# Patient Record
Sex: MALE | Race: WHITE
[De-identification: names, ages, dates, MRNs, and addresses within clinical notes are randomized per-mention and may not be internally consistent; named-entity substitution may affect disease eponyms.]

---

## 2020-07-15 ENCOUNTER — HOSPITAL ENCOUNTER (INPATIENT)
Dept: HOSPITAL 41 - JD.ED | Age: 55
LOS: 7 days | Discharge: NURSING, REHAB OR LONG-TERM CARE | End: 2020-07-22
Payer: COMMERCIAL

## 2020-07-15 DIAGNOSIS — G93.41: ICD-10-CM

## 2020-07-15 DIAGNOSIS — R33.9: ICD-10-CM

## 2020-07-15 DIAGNOSIS — R73.9: ICD-10-CM

## 2020-07-15 DIAGNOSIS — D72.829: ICD-10-CM

## 2020-07-15 DIAGNOSIS — F32.9: ICD-10-CM

## 2020-07-15 DIAGNOSIS — E87.2: ICD-10-CM

## 2020-07-15 DIAGNOSIS — E66.9: ICD-10-CM

## 2020-07-15 DIAGNOSIS — E83.42: ICD-10-CM

## 2020-07-15 DIAGNOSIS — R74.8: ICD-10-CM

## 2020-07-15 DIAGNOSIS — K21.9: ICD-10-CM

## 2020-07-15 DIAGNOSIS — K76.89: ICD-10-CM

## 2020-07-15 DIAGNOSIS — N17.9: ICD-10-CM

## 2020-07-15 DIAGNOSIS — N18.9: ICD-10-CM

## 2020-07-15 DIAGNOSIS — F10.239: Primary | ICD-10-CM

## 2020-07-15 DIAGNOSIS — E87.1: ICD-10-CM

## 2020-07-15 DIAGNOSIS — Z20.828: ICD-10-CM

## 2020-07-15 DIAGNOSIS — D69.6: ICD-10-CM

## 2020-07-15 DIAGNOSIS — E78.5: ICD-10-CM

## 2020-07-15 DIAGNOSIS — I10: ICD-10-CM

## 2020-07-15 DIAGNOSIS — Z79.899: ICD-10-CM

## 2020-07-15 PROCEDURE — U0002 COVID-19 LAB TEST NON-CDC: HCPCS

## 2020-07-15 RX ADMIN — THERA TABS SCH EACH: TAB at 20:21

## 2020-07-15 RX ADMIN — LORAZEPAM PRN MG: 2 INJECTION INTRAMUSCULAR; INTRAVENOUS at 23:23

## 2020-07-15 RX ADMIN — LORAZEPAM PRN MG: 2 INJECTION INTRAMUSCULAR; INTRAVENOUS at 22:10

## 2020-07-15 NOTE — EDM.PDOC
ED HPI GENERAL MEDICAL PROBLEM





- General


Chief Complaint: Drug or Alcohol Abuse


Stated Complaint: ALCOHOLIC ISSUES


Time Seen by Provider: 07/15/20 14:37


Source of Information: Reports: Patient, Family


History Limitations: Reports: No Limitations





- History of Present Illness


INITIAL COMMENTS - FREE TEXT/NARRATIVE: 





Patient is a 54-year-old male who presents to the emergency department with his 

wife with some altered mental status and staggering gait.  Wife states this 

began yesterday.  Wife reports patient has a history of alcohol abuse, however 

she is unsure the extent of it as he hides his drinking.  She is found empty 

1.25 L bottles of vodka on numerous occasions.  She knows with certainty that he

drinks  and Monday.  She states that this has been Becoming a worsening 

problem since January.  Wife states he has not gone to work all week, and this 

is happened on a number of other occasions.  He currently works for community 

[employer]..  In March he went through an episode where he quit drinking and did 

have withdrawal symptoms including shaking and diaphoresis.  Wife states that 

her and her daughters have been trying to keep a close eye on him.  She states 

that she "rigged the laundry room" because he uses that door to go out to the 

garage and she thinks that is probably where he has been drinking.  She states 

he has hiding spots for his alcohol but she does not know where there are.  She 

states that he does not want his mom to find out about his drinking because his 

dad  of alcoholism his grandfather was also a chronic alcoholic.





History from the patient is somewhat limited.  He was asked if he would like his

wife to leave the room while we visited which he stated he did, however after 

she left the room, he did not provide much information.  When asked how often he

drinks he says "I do not know ".  He tells me his last drink was  night, 

however his wife states that he did drink on Monday also.  When asked if he 

needs help with his drinking he says no.  When asked how he is feeling today he 

states "horrible ".  He does not offer any input as to what he means by that.  

When asked if he has a headache he says yes he also agrees that he is nauseous. 

He does not feel confused, denies any visual disturbances, denies auditory 

hallucinations.  When asked if he knows what the day of the week is, he states 

yes, however he is unable to provide the day of the week.  He did agree to have 

a work-up done.





- Related Data


                                    Allergies











Allergy/AdvReac Type Severity Reaction Status Date / Time


 


No Known Allergies Allergy   Verified 07/15/20 14:51











Home Meds: 


                                    Home Meds





Hydrochlorothiazide/Lisinopril [Lisinopril/HCTZ 20-12.5 MG] 1 tab PO DAILY 

07/15/20 [History]


Metoprolol Succinate [Toprol XL 50mg] 50 mg PO DAILY 07/15/20 [History]


Omeprazole 1 tab PO DAILY 07/15/20 [History]


Sertraline [Zoloft] 50 mg PO DAILY 07/15/20 [History]


Simvastatin 40 mg PO DAILY 07/15/20 [History]











Past Medical History


HEENT History: Reports: Impaired Vision


Cardiovascular History: Reports: High Cholesterol, Hypertension


Psychiatric History: Reports: Addiction, Depression





- Past Surgical History


GI Surgical History: Reports: Appendectomy





ED ROS GENERAL





- Review of Systems


Review Of Systems: See Below


Constitutional: Reports: No Symptoms.  Denies: Fever, Chills


HEENT: Reports: No Symptoms


Respiratory: Reports: No Symptoms.  Denies: Shortness of Breath, Cough


Cardiovascular: Reports: No Symptoms.  Denies: Chest Pain, Syncope


Endocrine: Reports: No Symptoms


GI/Abdominal: Reports: Nausea.  Denies: Abdominal Pain, Diarrhea, Vomiting


: Reports: No Symptoms


Musculoskeletal: Reports: No Symptoms


Skin: Reports: No Symptoms


Neurological: Reports: Confusion, Headache, Gait Disturbance.  Denies: Dizziness


Psychiatric: Reports: Confusion, Depression.  Denies: Agitation, Hallucinations,

 Suicidal Ideation


Hematologic/Lymphatic: Reports: No Symptoms


Immunologic: Reports: No Symptoms





- Physical Exam


Exam: See Below


Exam Limited By: No Limitations


General Appearance: Alert, WD/WN, No Apparent Distress


Eye Exam: Bilateral Eye: PERRL


Respiratory/Chest: No Respiratory Distress, Lungs Clear, Normal Breath Sounds, 

No Accessory Muscle Use, Chest Non-Tender


Cardiovascular: Normal Peripheral Pulses, Regular Rate, Rhythm, No Edema, No 

Gallop, No JVD, No Murmur, No Rub


GI/Abdominal: Normal Bowel Sounds, Soft, Non-Tender, No Organomegaly, No 

Distention, No Abnormal Bruit, No Mass


Neuro Exam (Abbreviated): Alert, CN II-XII Intact, Normal Cognition, Normal 

Gait, Normal Reflexes, No Motor/Sensory Deficits, Confused, Disoriented (To 

time)


Psychiatric: Flat Affect


Skin Exam: Warm, Dry, Intact, Normal Color, No Rash





EKG INTERPRETATION


EKG Date: 07/15/20


Time: 15:37


Rhythm: NSR


Rate (Beats/Min): 76


Axis: LAD-Left Axis Deviation


P-Wave: Present


QRS: Normal


ST-T: Normal


QT: Normal


EKG Interpretation Comments: 


Sinus rhythm at 76/min


Left axis deviation


Early R wave transition -consider right ventricular hypertrophy/septal 

hypertrophy


Q waves in lead III and aVF-old inferior wall MI


EKG interpreted by Dr. Luann HERRING








Course





- Vital Signs


Last Recorded V/S: 


                                Last Vital Signs











Temp  98.5 F   07/15/20 18:50


 


Pulse  79   07/15/20 14:45


 


Resp  20   07/15/20 18:50


 


BP  156/81 H  07/15/20 18:50


 


Pulse Ox  97   07/15/20 18:50














- Orders/Labs/Meds


Orders: 


                               Active Orders 24 hr











 Category Date Time Status


 


 Sodium Chloride 0.9% [Normal Saline] 1,000 ml Med  07/15/20 15:30 Active





 IV ASDIRECTED   


 


 Sodium Chloride 0.9% [Normal Saline] 1,000 ml Med  07/15/20 16:30 Active





 IV ASDIRECTED   


 


 Sodium Chloride 0.9% [Saline Flush] Med  07/15/20 15:17 Active





 10 ml FLUSH ASDIRECTED PRN   


 


 Peripheral IV Insertion Adult [OM.PC] Stat Oth  07/15/20 15:16 Ordered








                                Medication Orders





Albuterol/Ipratropium (Duoneb 3.0-0.5 Mg/3 Ml)  3 ml NEB Q4H PRN


   PRN Reason: Shortness Of Breath/wheezing


Bisacodyl (Dulcolax)  5 mg PO DAILY PRN


   PRN Reason: Constipation


Clonidine HCl (Catapres)  0.1 mg PO Q4H PRN


   PRN Reason: Agitation


Docusate Sodium (Colace)  100 mg PO BID PRN


   PRN Reason: Constipation


Folic Acid (Folic Acid)  1 mg PO BEDTIME [facility]


   Last Admin: 07/15/20 20:21  Dose: 1 mg


   Documented by: [email or web address]


Haloperidol Lactate (Haldol)  2 mg IM Q4H PRN


   PRN Reason: Agitation


Sodium Chloride (Normal Saline)  1,000 mls @ 999 mls/hr IV ASDIRECTED [facility]


   Last Admin: 07/15/20 15:29  Dose: 999 mls/hr


   Documented by: HERMMIC


Sodium Chloride (Normal Saline)  1,000 mls @ 999 mls/hr IV ASDIRECTED [facility]


   Last Admin: 07/15/20 17:29  Dose: 999 mls/hr


   Documented by: HERMMIC


Promethazine HCl 12.5 mg/ (Sodium Chloride)  50.5 mls @ 100 mls/hr IV Q6H PRN


   PRN Reason: Nausea/Vomiting


   Last Admin: 07/15/20 20:30  Dose: 100 mls/hr


   Documented by: WENDI


Metoprolol Succinate (Toprol Xl)  50 mg PO DAILY [facility]


Metoprolol Tartrate (Lopressor)  5 mg IVPUSH Q4H PRN


   PRN Reason: Tachycardia


Multivitamins (Thera)  1 each PO BEDTIME [facility]


   Last Admin: 07/15/20 20:21  Dose: 1 each


   Documented by: WENDI


Ondansetron HCl (Zofran)  4 mg IV Q6H PRN


   PRN Reason: Nausea/Vomiting


Pantoprazole Sodium (Protonix***)  40 mg PO DAILY [facility]


Quetiapine Fumarate (Seroquel)  50 mg PO BEDTIME [facility]


   Last Admin: 07/15/20 20:21  Dose: 50 mg


   Documented by: WENDI


Senna/Docusate Sodium (Senna Plus)  1 tab PO BID PRN


   PRN Reason: Constipation


Sodium Chloride (Saline Flush)  10 ml FLUSH ASDIRECTED PRN


   PRN Reason: Keep Vein Open


   Last Admin: 07/15/20 15:31  Dose: 10 ml


   Documented by: JAKUB


Thiamine HCl (Vitamin B-1)  100 mg PO BEDTIME [facility]


   Last Admin: 07/15/20 20:21  Dose: 100 mg


   Documented by: WENDI


Topiramate (Topamax)  25 mg PO BID [facility]


   Last Admin: 07/15/20 20:21  Dose: 25 mg


   Documented by: WENDI








Labs: 


                                Laboratory Tests











  07/15/20 07/15/20 07/15/20 Range/Units





  14:40 14:48 14:48 


 


WBC   11.80 H   (4.23-9.07)  K/mm3


 


RBC   5.76   (4.63-6.08)  M/mm3


 


Hgb   16.2   (13.7-17.5)  gm/dl


 


Hct   47.0   (40.1-51.0)  %


 


MCV   81.6   (79.0-92.2)  fl


 


MCH   28.1   (25.7-32.2)  pg


 


MCHC   34.5   (32.2-35.5)  g/dl


 


RDW Std Deviation   41.0   (35.1-43.9)  fL


 


Plt Count   493 H   (163-337)  K/mm3


 


MPV   8.1 L   (9.4-12.3)  fl


 


Neut % (Auto)   78.6 H   (34.0-67.9)  %


 


Lymph % (Auto)   17.0 L   (21.8-53.1)  %


 


Mono % (Auto)   4.1 L   (5.3-12.2)  %


 


Eos % (Auto)   0 L   (0.8-7.0)  


 


Baso % (Auto)   0.1   (0.1-1.2)  %


 


Neut # (Auto)   9.28 H   (1.78-5.38)  K/mm3


 


Lymph # (Auto)   2.01   (1.32-3.57)  K/mm3


 


Mono # (Auto)   0.48   (0.30-0.82)  K/mm3


 


Eos # (Auto)   0.00 L   (0.04-0.54)  K/mm3


 


Baso # (Auto)   0.01   (0.01-0.08)  K/mm3


 


Sodium    133 L  (136-145)  mEq/L


 


Potassium    4.2  (3.5-5.1)  mEq/L


 


Chloride    98  ()  mEq/L


 


Carbon Dioxide    19 L  (21-32)  mEq/L


 


Anion Gap    20.2 H  (5-15)  


 


BUN    49 H  (7-18)  mg/dL


 


Creatinine    2.6 H  (0.7-1.3)  mg/dL


 


Est Cr Clr Drug Dosing    31.42  mL/min


 


Estimated GFR (MDRD)    26  (>60)  mL/min


 


BUN/Creatinine Ratio    18.8 H  (14-18)  


 


Glucose    172 H  ()  mg/dL


 


Calcium    8.8  (8.5-10.1)  mg/dL


 


Magnesium  2.4    (1.8-2.4)  mg/dl


 


Total Bilirubin    0.6  (0.2-1.0)  mg/dL


 


AST    48 H  (15-37)  U/L


 


ALT    77 H  (16-63)  U/L


 


Alkaline Phosphatase    98  ()  U/L


 


Creatine Kinase     ()  U/L


 


Troponin I    < 0.017  (0.00-0.056)  ng/mL


 


C-Reactive Protein    < 0.2  (<1.0)  mg/dL


 


Total Protein    8.4 H  (6.4-8.2)  g/dl


 


Albumin    4.1  (3.4-5.0)  g/dl


 


Globulin    4.3  gm/dL


 


Albumin/Globulin Ratio    1.0  (1-2)  


 


Ethyl Alcohol    0.00  (0.00)  gm%


 


COVID-19 (MILES)     (NEGATIVE)  














  07/15/20 07/15/20 Range/Units





  14:48 17:02 


 


WBC    (4.23-9.07)  K/mm3


 


RBC    (4.63-6.08)  M/mm3


 


Hgb    (13.7-17.5)  gm/dl


 


Hct    (40.1-51.0)  %


 


MCV    (79.0-92.2)  fl


 


MCH    (25.7-32.2)  pg


 


MCHC    (32.2-35.5)  g/dl


 


RDW Std Deviation    (35.1-43.9)  fL


 


Plt Count    (163-337)  K/mm3


 


MPV    (9.4-12.3)  fl


 


Neut % (Auto)    (34.0-67.9)  %


 


Lymph % (Auto)    (21.8-53.1)  %


 


Mono % (Auto)    (5.3-12.2)  %


 


Eos % (Auto)    (0.8-7.0)  


 


Baso % (Auto)    (0.1-1.2)  %


 


Neut # (Auto)    (1.78-5.38)  K/mm3


 


Lymph # (Auto)    (1.32-3.57)  K/mm3


 


Mono # (Auto)    (0.30-0.82)  K/mm3


 


Eos # (Auto)    (0.04-0.54)  K/mm3


 


Baso # (Auto)    (0.01-0.08)  K/mm3


 


Sodium    (136-145)  mEq/L


 


Potassium    (3.5-5.1)  mEq/L


 


Chloride    ()  mEq/L


 


Carbon Dioxide    (21-32)  mEq/L


 


Anion Gap    (5-15)  


 


BUN    (7-18)  mg/dL


 


Creatinine    (0.7-1.3)  mg/dL


 


Est Cr Clr Drug Dosing    mL/min


 


Estimated GFR (MDRD)    (>60)  mL/min


 


BUN/Creatinine Ratio    (14-18)  


 


Glucose    ()  mg/dL


 


Calcium    (8.5-10.1)  mg/dL


 


Magnesium    (1.8-2.4)  mg/dl


 


Total Bilirubin    (0.2-1.0)  mg/dL


 


AST    (15-37)  U/L


 


ALT    (16-63)  U/L


 


Alkaline Phosphatase    ()  U/L


 


Creatine Kinase  229   ()  U/L


 


Troponin I    (0.00-0.056)  ng/mL


 


C-Reactive Protein    (<1.0)  mg/dL


 


Total Protein    (6.4-8.2)  g/dl


 


Albumin    (3.4-5.0)  g/dl


 


Globulin    gm/dL


 


Albumin/Globulin Ratio    (1-2)  


 


Ethyl Alcohol    (0.00)  gm%


 


COVID-19 (MILES)   Negative  (NEGATIVE)  











Meds: 


Medications











Generic Name Dose Route Start Last Admin





  Trade Name Freq  PRN Reason Stop Dose Admin


 


Albuterol/Ipratropium  3 ml  07/15/20 18:50 





  Duoneb 3.0-0.5 Mg/3 Ml  NEB  





  Q4H PRN  





  Shortness Of Breath/wheezing  


 


Bisacodyl  5 mg  07/15/20 18:50 





  Dulcolax  PO  





  DAILY PRN  





  Constipation  


 


Clonidine HCl  0.1 mg  07/15/20 19:02 





  Catapres  PO  





  Q4H PRN  





  Agitation  


 


Docusate Sodium  100 mg  07/15/20 18:50 





  Colace  PO  





  BID PRN  





  Constipation  


 


Folic Acid  1 mg  07/15/20 21:00  07/15/20 20:21





  Folic Acid  PO   1 mg





  BEDTIME [facility]   Administration


 


Haloperidol Lactate  2 mg  07/15/20 19:02 





  Haldol  IM  





  Q4H PRN  





  Agitation  


 


Sodium Chloride  1,000 mls @ 999 mls/hr  07/15/20 15:30  07/15/20 15:29





  Normal Saline  IV   999 mls/hr





  ASDIRECTED [facility]   Administration


 


Sodium Chloride  1,000 mls @ 999 mls/hr  07/15/20 16:30  07/15/20 17:29





  Normal Saline  IV   999 mls/hr





  ASDIRECTED [facility]   Administration


 


Promethazine HCl 12.5 mg/  50.5 mls @ 100 mls/hr  07/15/20 18:50  07/15/20 20:30





  Sodium Chloride  IV   100 mls/hr





  Q6H PRN   Administration





  Nausea/Vomiting  


 


Metoprolol Succinate  50 mg  20 09:00 





  Toprol Xl  PO  





  DAILY [facility]  


 


Metoprolol Tartrate  5 mg  07/15/20 19:03 





  Lopressor  IVPUSH  





  Q4H PRN  





  Tachycardia  


 


Multivitamins  1 each  07/15/20 21:00  07/15/20 20:21





  Thera  PO   1 each





  BEDTIME [facility]   Administration


 


Ondansetron HCl  4 mg  07/15/20 18:50 





  Zofran  IV  





  Q6H PRN  





  Nausea/Vomiting  


 


Pantoprazole Sodium  40 mg  20 09:00 





  Protonix***  PO  





  DAILY [facility]  


 


Quetiapine Fumarate  50 mg  07/15/20 21:00  07/15/20 20:21





  Seroquel  PO   50 mg





  BEDTIME [facility]   Administration


 


Senna/Docusate Sodium  1 tab  07/15/20 18:50 





  Senna Plus  PO  





  BID PRN  





  Constipation  


 


Sodium Chloride  10 ml  07/15/20 15:17  07/15/20 15:31





  Saline Flush  FLUSH   10 ml





  ASDIRECTED PRN   Administration





  Keep Vein Open  


 


Thiamine HCl  100 mg  07/15/20 21:00  07/15/20 20:21





  Vitamin B-1  PO   100 mg





  BEDTIME [facility]   Administration


 


Topiramate  25 mg  07/15/20 21:00  07/15/20 20:21





  Topamax  PO   25 mg





  BID [facility]   Administration














Discontinued Medications














Generic Name Dose Route Start Last Admin





  Trade Name Freq  PRN Reason Stop Dose Admin


 


Diphenhydramine HCl  25 mg  07/15/20 19:01  07/15/20 20:22





  Benadryl  IVPUSH  07/15/20 19:02  25 mg





  ONETIME ONE   Administration


 


Ondansetron HCl  4 mg  07/15/20 15:17  07/15/20 15:29





  Zofran  IVPUSH  07/15/20 15:18  4 mg





  ONETIME ONE   Administration














- Re-Assessments/Exams


Free Text/Narrative Re-Assessment/Exam: 


Patient is a 54-year-old male brought in by his wife with complaints of altered 

mental status and unsteady gait.  Patient does not offer much information, and 

wife is unsure of how much he drinks as he hides his drinking.  He could be 

suffering from alcohol withdrawal, however it is difficult to determine this 

based on his uncertain history of alcohol abuse.  Neurologic exam is essentially

 normal, with the exception of his confusion.  Grasps are equal and strong.  

Pupils are equal and reactive.  I have ordered a CBC, CMP, CRP, troponin, EKG, 

EtOH, urinalysis, drug screen, and CT scan of the head.  We will give him a 

liter of IV fluids, as well as Zofran for nausea.





07/15/20 16:11


Hematology was significant for WBC slightly elevated 11.8, platelets elevated at

 493, sodium slightly low at 133, CO2 low at 19, anion gap elevated at 20.2, BUN

 elevated 49, creatinine elevated at 2.6, glucose elevated at 172, troponin is 

negative, CRP is normal, blood alcohol is 0, CT scan of the head was negative 

for any acute findings.  Based on patient's new onset confusion and difficulty 

with gait, we could be dealing with alcohol withdrawal, however I have ordered 

an MRI of the brain to rule out stroke.  Discussed this with the patient and his

 wife and they are in agreement with this plan.  I will order a second liter of 

NS bolus as he is quite dry.  Urinalysis and drug screen pending.





07/15/20 17:51


MRI results were negative for any acute abnormalities.  Patient has yet to 

produce a urine sample.  I did call and speak with the hospitalist on-call, Dr. Cabrera.  He has accepted the patient for admission to the ICU for confusion and 

acute renal failure.








Departure





- Departure


Time of Disposition: 17:51


Disposition: Admitted As Inpatient 66


Condition: Good


Clinical Impression: 


Altered mental state


Qualifiers:


 Altered mental status type: disorientation Qualified Code(s): R41.0 - 

Disorientation, unspecified





Acute renal failure (ARF)


Qualifiers:


 Acute renal failure type: unspecified Qualified Code(s): N17.9 - Acute kidney 

failure, unspecified








- Discharge Information





Sepsis Event Note (ED)





- Evaluation


Sepsis Screening Result: No Definite Risk





- Focused Exam


Vital Signs: 


                                   Vital Signs











  Temp Pulse Resp BP Pulse Ox


 


 07/15/20 14:45  96.0 F L  79  16  121/82  94 L














- My Orders


Last 24 Hours: 


My Active Orders





07/15/20 15:16


Peripheral IV Insertion Adult [OM.PC] Stat 





07/15/20 15:17


Sodium Chloride 0.9% [Saline Flush]   10 ml FLUSH ASDIRECTED PRN 





07/15/20 15:30


Sodium Chloride 0.9% [Normal Saline] 1,000 ml IV ASDIRECTED 





07/15/20 16:30


Sodium Chloride 0.9% [Normal Saline] 1,000 ml IV ASDIRECTED 














- Assessment/Plan


Last 24 Hours: 


My Active Orders





07/15/20 15:16


Peripheral IV Insertion Adult [OM.PC] Stat 





07/15/20 15:17


Sodium Chloride 0.9% [Saline Flush]   10 ml FLUSH ASDIRECTED PRN 





07/15/20 15:30


Sodium Chloride 0.9% [Normal Saline] 1,000 ml IV ASDIRECTED 





07/15/20 16:30


Sodium Chloride 0.9% [Normal Saline] 1,000 ml IV ASDIRECTED

## 2020-07-15 NOTE — PCM.HP.2
H&P History of Present Illness





- General


Date of Service: 07/15/20


Admit Problem/Dx: 


ETOH Withdrawal


Source of Information: Family, Provider, RN Notes Reviewed


History Limitations: Reports: Altered Mental Status





- History of Present Illness


Initial Comments - Free Text/Narative: 


This is a 55 yo white male with past medical hx/o chronic alcoholism, 

hypertension, hyperlipidemia, GERD, and depression who was brought in by his 

wife due to acute confusion with withdrawal symptoms. His last drink was appar

ently last night but according to him it was this past Sunday. He was not very 

forthcoming with his alcohol abuse. However his wife thinks he drinks heavily 

about 1.25 L of vodka a day and hides his drinking from family. His wife 

believes his drinking has gotten out of hand since January. He has had etoh 

withdrawal in the past. Per family, he was tremulous and sweating. This morning 

he was alert, awake, but confused. He was able to answer yes or no but cannot 

offer a meaning conversation. No report of visual, tactile or auditory 

hallucinations.





His initial work up in the emergency shows a CBC remarkable for WBC of 11.80, 

platelet # of 493, Neutrophils of 78.6%, Lymphocyte # of 147% and Monocytes # of

4.1%. His chemistry is significant for sodium of 133, CO2 of 19, AG of 20.2, BUN

of 49, Cr of 2.6, BS of 172, AST of 48, ALT of 77 and Total Protein of 8.4. His 

ETOH and COVID-19 screening are negative. Brain MRI and Head CT scan reports are

both negative for acute abnormal findings. Patient is coming in primarily for 

etoh withdrawal. 





- Related Data


Allergies/Adverse Reactions: 


                                    Allergies











Allergy/AdvReac Type Severity Reaction Status Date / Time


 


No Known Allergies Allergy   Verified 07/15/20 23:41











Home Medications: 


                                    Home Meds





Hydrochlorothiazide/Lisinopril [Lisinopril/HCTZ 20-12.5 MG] 1 tab PO DAILY 

07/15/20 [History]


Metoprolol Succinate [Toprol XL 50mg] 50 mg PO DAILY 07/15/20 [History]


Omeprazole 1 tab PO DAILY 07/15/20 [History]


Sertraline [Zoloft] 50 mg PO DAILY 07/15/20 [History]


Simvastatin 40 mg PO DAILY 07/15/20 [History]











Past Medical History


HEENT History: Reports: Impaired Vision


Cardiovascular History: Reports: High Cholesterol, Hypertension


Psychiatric History: Reports: Addiction, Depression





- Past Surgical History


GI Surgical History: Reports: Appendectomy





Social & Family History





- Tobacco Use


Smoking Status *Q: Never Smoker





- Caffeine Use


Caffeine Use: Reports: Coffee





- Recreational Drug Use


Recreational Drug Use: No





H&P Review of Systems





- Review of Systems:


Review Of Systems: See Below


General: Denies: Fever, Chills


HEENT: Reports: Headaches


Pulmonary: Denies: Shortness of Breath, Pleuritic Chest Pain


Cardiovascular: Denies: Chest Pain, Palpitations, Dyspnea on Exertion


Gastrointestinal: Reports: Nausea.  Denies: Abdominal Pain, Constipation, 

Vomiting


Genitourinary: Reports: No Symptoms


Musculoskeletal: Reports: No Symptoms


Skin: Denies: Diaphoresis, Bruising, Rash


Psychiatric: Reports: Confusion.  Denies: Anxiety, Agitation, Cravings, 

Hallucinations, Suicidal Ideation, Homicidal Ideation


Neurological: Reports: Gait Disturbance.  Denies: Dizziness, Numbness, 

Paresthesia, Seizure, Syncope, Tingling, Tremors, Trouble Speaking, Weakness, 

Change in Speech


Hematologic/Lymphatic: Reports: No Symptoms


Immunologic: Reports: No Symptoms





Exam





- Exam


Exam: See Below





- Vital Signs


Vital Signs: 


                                Last Vital Signs











Temp  35.6 C L  07/15/20 14:45


 


Pulse  79   07/15/20 14:45


 


Resp  16   07/15/20 14:45


 


BP  121/82   07/15/20 14:45


 


Pulse Ox  94 L  07/15/20 14:45











Weight: 95.254 kg





- Exam


General: Cooperative.  No: Moderate Distress


HEENT: Conjunctiva Clear, EACs Clear


Neck: Supple, Trachea Midline


Lungs: Clear to Auscultation, Normal Respiratory Effort


Cardiovascular: Regular Rate, Regular Rhythm


GI/Abdominal Exam: Normal Bowel Sounds, Soft, Non-Tender, No Organomegaly, No 

Distention, No Abnormal Bruit


 (Male) Exam: Deferred


Rectal (Males) Exam: Deferred


Back Exam: Normal Inspection, Decreased Range of Motion


Extremities: Normal Inspection, Normal Range of Motion, Non-Tender, No Pedal 

Edema, Other (unstable gait)


Peripheral Pulses: 1+: Dorsalis Pedis (L), Dorsalis Pedis (R)


Skin: Warm, Dry, Intact


Neurological: Abnormal Gait


Neuro Extensive - Mental Status: Normal Mood/Affect, Opens Eyes to Commands, 

Slow Response to Commands, Other (able to answer yes or no; he is not able to 

offer a meaningful conversation).  No: Normal Cognition


Neuro Extensive - Motor, Sensory, Reflexes: Other (Confused at the present).  

No: Tremor


Psychiatric: No: Anxious, Agitated, Suicidal Ideation, Homicidal Ideation, 

Hallucinations, Withdrawal Symptoms





- Patient Data


Lab Results Last 24 hrs: 


                         Laboratory Results - last 24 hr











  07/15/20 07/15/20 07/15/20 Range/Units





  14:48 14:48 14:48 


 


WBC  11.80 H    (4.23-9.07)  K/mm3


 


RBC  5.76    (4.63-6.08)  M/mm3


 


Hgb  16.2    (13.7-17.5)  gm/dl


 


Hct  47.0    (40.1-51.0)  %


 


MCV  81.6    (79.0-92.2)  fl


 


MCH  28.1    (25.7-32.2)  pg


 


MCHC  34.5    (32.2-35.5)  g/dl


 


RDW Std Deviation  41.0    (35.1-43.9)  fL


 


Plt Count  493 H    (163-337)  K/mm3


 


MPV  8.1 L    (9.4-12.3)  fl


 


Neut % (Auto)  78.6 H    (34.0-67.9)  %


 


Lymph % (Auto)  17.0 L    (21.8-53.1)  %


 


Mono % (Auto)  4.1 L    (5.3-12.2)  %


 


Eos % (Auto)  0 L    (0.8-7.0)  


 


Baso % (Auto)  0.1    (0.1-1.2)  %


 


Neut # (Auto)  9.28 H    (1.78-5.38)  K/mm3


 


Lymph # (Auto)  2.01    (1.32-3.57)  K/mm3


 


Mono # (Auto)  0.48    (0.30-0.82)  K/mm3


 


Eos # (Auto)  0.00 L    (0.04-0.54)  K/mm3


 


Baso # (Auto)  0.01    (0.01-0.08)  K/mm3


 


Sodium   133 L   (136-145)  mEq/L


 


Potassium   4.2   (3.5-5.1)  mEq/L


 


Chloride   98   ()  mEq/L


 


Carbon Dioxide   19 L   (21-32)  mEq/L


 


Anion Gap   20.2 H   (5-15)  


 


BUN   49 H   (7-18)  mg/dL


 


Creatinine   2.6 H   (0.7-1.3)  mg/dL


 


Est Cr Clr Drug Dosing   31.42   mL/min


 


Estimated GFR (MDRD)   26   (>60)  mL/min


 


BUN/Creatinine Ratio   18.8 H   (14-18)  


 


Glucose   172 H   ()  mg/dL


 


Calcium   8.8   (8.5-10.1)  mg/dL


 


Total Bilirubin   0.6   (0.2-1.0)  mg/dL


 


AST   48 H   (15-37)  U/L


 


ALT   77 H   (16-63)  U/L


 


Alkaline Phosphatase   98   ()  U/L


 


Creatine Kinase    229  ()  U/L


 


Troponin I   < 0.017   (0.00-0.056)  ng/mL


 


C-Reactive Protein   < 0.2   (<1.0)  mg/dL


 


Total Protein   8.4 H   (6.4-8.2)  g/dl


 


Albumin   4.1   (3.4-5.0)  g/dl


 


Globulin   4.3   gm/dL


 


Albumin/Globulin Ratio   1.0   (1-2)  


 


Ethyl Alcohol   0.00   (0.00)  gm%


 


COVID-19 (MILES)     (NEGATIVE)  














  07/15/20 Range/Units





  17:02 


 


WBC   (4.23-9.07)  K/mm3


 


RBC   (4.63-6.08)  M/mm3


 


Hgb   (13.7-17.5)  gm/dl


 


Hct   (40.1-51.0)  %


 


MCV   (79.0-92.2)  fl


 


MCH   (25.7-32.2)  pg


 


MCHC   (32.2-35.5)  g/dl


 


RDW Std Deviation   (35.1-43.9)  fL


 


Plt Count   (163-337)  K/mm3


 


MPV   (9.4-12.3)  fl


 


Neut % (Auto)   (34.0-67.9)  %


 


Lymph % (Auto)   (21.8-53.1)  %


 


Mono % (Auto)   (5.3-12.2)  %


 


Eos % (Auto)   (0.8-7.0)  


 


Baso % (Auto)   (0.1-1.2)  %


 


Neut # (Auto)   (1.78-5.38)  K/mm3


 


Lymph # (Auto)   (1.32-3.57)  K/mm3


 


Mono # (Auto)   (0.30-0.82)  K/mm3


 


Eos # (Auto)   (0.04-0.54)  K/mm3


 


Baso # (Auto)   (0.01-0.08)  K/mm3


 


Sodium   (136-145)  mEq/L


 


Potassium   (3.5-5.1)  mEq/L


 


Chloride   ()  mEq/L


 


Carbon Dioxide   (21-32)  mEq/L


 


Anion Gap   (5-15)  


 


BUN   (7-18)  mg/dL


 


Creatinine   (0.7-1.3)  mg/dL


 


Est Cr Clr Drug Dosing   mL/min


 


Estimated GFR (MDRD)   (>60)  mL/min


 


BUN/Creatinine Ratio   (14-18)  


 


Glucose   ()  mg/dL


 


Calcium   (8.5-10.1)  mg/dL


 


Total Bilirubin   (0.2-1.0)  mg/dL


 


AST   (15-37)  U/L


 


ALT   (16-63)  U/L


 


Alkaline Phosphatase   ()  U/L


 


Creatine Kinase   ()  U/L


 


Troponin I   (0.00-0.056)  ng/mL


 


C-Reactive Protein   (<1.0)  mg/dL


 


Total Protein   (6.4-8.2)  g/dl


 


Albumin   (3.4-5.0)  g/dl


 


Globulin   gm/dL


 


Albumin/Globulin Ratio   (1-2)  


 


Ethyl Alcohol   (0.00)  gm%


 


COVID-19 (MILES)  Negative  (NEGATIVE)  











Result Diagrams: 


                                 07/16/20 05:28





                                 07/16/20 05:28





Sepsis Event Note





- Evaluation


Sepsis Screening Result: No Definite Risk





- Focused Exam


Vital Signs: 


                                   Vital Signs











  Temp Pulse Resp BP Pulse Ox


 


 07/15/20 14:45  35.6 C L  79  16  121/82  94 L











Date Exam was Performed: 07/16/20


Time Exam was Performed: 11:13


Problem List Initiated/Reviewed/Updated: Yes


Orders Last 24hrs: 


                               Active Orders 24 hr











 Category Date Time Status


 


 EKG Documentation Completion [RC] STAT Care  07/15/20 15:16 Active


 


 Peripheral IV Care [RC] .AS DIRECTED Care  07/15/20 15:17 Active


 


 DRUG SCREEN, URINE [URCHEM] Stat Lab  07/15/20 15:17 Ordered


 


 UA RFX DAYLIN AND CULT IF INDIC [URIN] Stat Lab  07/15/20 15:16 Ordered


 


 Sodium Chloride 0.9% [Normal Saline] 1,000 ml Med  07/15/20 15:30 Active





 IV ASDIRECTED   


 


 Sodium Chloride 0.9% [Normal Saline] 1,000 ml Med  07/15/20 16:30 Active





 IV ASDIRECTED   


 


 Sodium Chloride 0.9% [Saline Flush] Med  07/15/20 15:17 Active





 10 ml FLUSH ASDIRECTED PRN   


 


 Peripheral IV Insertion Adult [OM.PC] Stat Oth  07/15/20 15:16 Ordered








                                Medication Orders





Sodium Chloride (Normal Saline)  1,000 mls @ 999 mls/hr IV ASDIRECTED COURTNEY


   Last Admin: 07/15/20 15:29  Dose: 999 mls/hr


   Documented by: HERMMIC


Sodium Chloride (Normal Saline)  1,000 mls @ 999 mls/hr IV ASDIRECTED COURTNEY


   Last Admin: 07/15/20 17:29  Dose: 999 mls/hr


   Documented by: HERMMIC


Sodium Chloride (Saline Flush)  10 ml FLUSH ASDIRECTED PRN


   PRN Reason: Keep Vein Open


   Last Admin: 07/15/20 15:31  Dose: 10 ml


   Documented by: JAKUB








Assessment/Plan Comment:: 


This is a 55 yo white male with past medical hx/o chronic alcoholism, 

hypertension, hyperlipidemia, GERD, and depression who was brought in by his 

wife due to acute confusion with withdrawal symptoms.





Assessment:


Acute:


* ETOH Abuse/Withdrawal Symptoms. He carries a hx/o chronic alcoholism. He is 

  not very forth coming and it appears he hides his drinking from his family. he

   drinks hard liquor. His wife found 1.25L of Vodka. Per family, his drinking 

  has gotten out of hand since January. 





* Acute confusion 2/2 metabolic/toxic encephalopathy. Has abnormal e-lytes and 

  UA/UDS pending. Head CT scan and Brain MRI done in ED are both negative for 

  acute abnormal findings. This could be mainly to alcohol abuse. We will 

  monitor.





* Acute kidney injury vs CKD. This is likely 2/2 volume depletion and 

  dehydration from etoh abuse. No previous level for comparison. He comes in 

  with a CR of 2.6 with a BUN of 49. So far he has received 2L of NS in ED. 

  Plan: e-lytes studies, avoid nephrotoxic agents, monitor Is/Os, renal US to 

  r/o obstructive uropathy and repeat renal panel in AM.





* Hypovolemia Hyperosmolar Hyponatremia. This is likely due to etoh abuse and 

  not able to eat of drink adequately. He is volume depleted. he comes in with a

   NA level of 133. Although this could be affected by his HCTZ and Zoloft he 

  takes for maintenance medications. We will hold HCTZ for now and monitor lev

  els.





* Increased AG Metabolic Acidosis. This is 2/2 ETOh Abuse. he is afebrile and no

   signs of systemic infection. Expect to improve with hydration.





* Hyperglycemia. He comes to us with BS level of 172. He carries no hx/o glucose

   intolerance or diabetes. We will monitor.





* Transaminitis. He comes to us with AST and ALT levels of  respectively. This 

  is likely due to etoh abuse.He is on statin so we will hold if for now until 

  his liver enzymes improve.





* Mild Leukocytosis. This is likely due to stress. He is afebrile w/o signs of 

  systemic infection. We will monitor.





* Mild Thrombocytopenia. He comes in with an elevated platelet level of 493. 

  This is due to chronic alcohol use.


 


Chronic: 


* HTN


* HLD


* GERD


* Depression


* Chronic Alcoholism





Plan: Admit to ICU. ETOH Withdrawal orders. CIWAA protocol. Thiamine, folic acid

 and MVI. IV fluid for hydration. Renal U/S in AM. Avoid neprhotxic agents. 

Regular diet. Tele-psych consult with Dr. Muñiz. YEHUDA/CM for discharge planning.





- Mortality Measure


Prognosis:: Good

## 2020-07-15 NOTE — CT
Head CT

 

Technique: Multiple axial sections through the brain were obtained.  

Intravenous contrast was not utilized.

 

Comparison: No prior intracranial imaging is available.

 

Findings: Ventricles along with basal cisterns and sulci over the 

convexities are within normal limits for the patient's age.  No 

abnormal parenchymal densities are seen.  No evidence of intracranial 

hemorrhage.  No midline shift or mass-effect is seen.

 

Mild atherosclerotic calcification is seen within the carotid siphon 

and vertebral vessels.  Mild mucosal thickening is noted within the 

sphenoid and ethmoid sinuses.  Visualized mastoid sinuses are clear.  

No acute calvarial abnormality is appreciated.

 

Impression:

1.  Mild sinus findings which are most likely chronic.

2.  No acute intracranial abnormality is seen.

 

Note: Please correlate if patient's symptoms warrant further 

evaluation by MRI.

 

Diagnostic code #2

 

This report was dictated in MDT

## 2020-07-15 NOTE — MR
Mri brain

 

Technique: T1 sagittal; T2, T2 FLAIR, T1 and diffusion axial T2 

gradient echo axial; T1 and T2 gradient echo coronal images were 

obtained.

 

Comparison: Prior head CT study of 07/15/20.

 

Findings: Ventricles along with basal cisterns and sulci over the 

convexities appear within normal limits for the patient's age.  Normal

 signal void is seen within the major cerebral arteries within the 

skull base.  No abnormal signal is seen within the brain parenchyma.  

No midline shift or mass-effect is seen.  No acute diffusion 

abnormalities are appreciated.

 

Impression:

1.  No abnormality is appreciated on MRI study of the brain.  There 

are specifically no acute diffusion abnormalities being seen.

 

Diagnostic code #1

 

This report was dictated in MDT

## 2020-07-16 RX ADMIN — THERA TABS SCH EACH: TAB at 20:09

## 2020-07-16 RX ADMIN — LORAZEPAM PRN MG: 2 INJECTION INTRAMUSCULAR; INTRAVENOUS at 17:27

## 2020-07-16 RX ADMIN — LORAZEPAM PRN MG: 2 INJECTION INTRAMUSCULAR; INTRAVENOUS at 18:50

## 2020-07-16 RX ADMIN — LORAZEPAM PRN MG: 2 INJECTION INTRAMUSCULAR; INTRAVENOUS at 04:31

## 2020-07-16 RX ADMIN — LORAZEPAM PRN MG: 2 INJECTION INTRAMUSCULAR; INTRAVENOUS at 15:29

## 2020-07-16 RX ADMIN — LORAZEPAM PRN MG: 2 INJECTION INTRAMUSCULAR; INTRAVENOUS at 08:31

## 2020-07-16 RX ADMIN — LORAZEPAM PRN MG: 2 INJECTION INTRAMUSCULAR; INTRAVENOUS at 02:21

## 2020-07-16 RX ADMIN — LORAZEPAM PRN MG: 2 INJECTION INTRAMUSCULAR; INTRAVENOUS at 00:25

## 2020-07-16 RX ADMIN — LORAZEPAM PRN MG: 2 INJECTION INTRAMUSCULAR; INTRAVENOUS at 03:05

## 2020-07-16 RX ADMIN — HEPARIN SODIUM SCH UNITS: 5000 INJECTION, SOLUTION INTRAVENOUS; SUBCUTANEOUS at 13:30

## 2020-07-16 RX ADMIN — LORAZEPAM PRN MG: 2 INJECTION INTRAMUSCULAR; INTRAVENOUS at 05:42

## 2020-07-16 RX ADMIN — LORAZEPAM PRN MG: 2 INJECTION INTRAMUSCULAR; INTRAVENOUS at 11:03

## 2020-07-16 RX ADMIN — LORAZEPAM PRN MG: 2 INJECTION INTRAMUSCULAR; INTRAVENOUS at 20:11

## 2020-07-16 RX ADMIN — HEPARIN SODIUM SCH UNITS: 5000 INJECTION, SOLUTION INTRAVENOUS; SUBCUTANEOUS at 20:32

## 2020-07-16 NOTE — CT
CT abdomen and pelvis

 

Technique: Multiple axial sections were obtained from above the liver 

inferiorly through the pubic symphysis.  Intravenous and oral contrast

 not utilized.  Study performed as a ureteral stone protocol.

 

Comparison: Previous renal ultrasound exam performed earlier on the 

same day (8:53 AM).

 

Findings: Kidneys show no abnormal calcifications.  Previously 

suggested dilated left renal pelvis correlates to a cyst within the 

left kidney which is mostly cortical in location and measures about 

4.4 cm.  This slightly projects into the central kidney which is felt 

to be incidental.  No hydronephrosis is seen.  No ureteral dilatation 

or ureteral stone is seen.

 

Other findings: Liver shows fatty infiltration.  Scarring is noted 

within the right lung base.  Spleen appears within normal limits.  

Adrenal glands show no nodule.  Pancreas shows no discrete 

abnormality.  Gallbladder contains no calcified gallstones.  Abdominal

 aorta shows no aneurysm.  No retroperitoneal adenopathy or mesenteric

 abnormalities are seen.  Bulging of the anterior rectus sheath is 

seen anteriorly.  Garcia catheter is noted within the bladder.  No free

 fluid or inflammatory change is appreciated.  Mild diverticuli are 

seen within the sigmoid colon without diverticulitis.

 

Appendix is not appreciated with certainty.

 

Bone window settings were reviewed which shows mild degenerative 

change scattered within the spine.  No acute osseous finding is 

appreciated.

 

Fat-containing umbilical hernia is noted.

 

Impression:

1.  Cortical cyst within the left kidney causing the questioned 

dilated renal pelvis on recent ultrasound.  No hydronephrosis is seen.

  No renal calculi or ureteral calculi are seen.

2.  Other findings as noted above which are nonacute.  

 

Diagnostic code #2

 

This report was dictated in MDT

## 2020-07-16 NOTE — PCM.PN
- General Info


Date of Service: 07/16/20


Admission Dx/Problem (Free Text): 


ETOH Withdrawal


Subjective Update: 





7/16/20: Patient detoxed hard last night. He was getting ativan round the clock 

starting at 11 pm. He is comfortably resting this morning. His CIWAA is bet 7-

10.n His UA and UDS were negative. 


Functional Status: Reports: Pain Controlled





- Review of Systems


General: Denies: Fever, Chills


Pulmonary: Denies: Shortness of Breath


Gastrointestinal: Denies: Abdominal Pain, Nausea, Vomiting


Genitourinary: Denies: No Symptoms


Musculoskeletal: Reports: No Symptoms


Skin: Denies: Cyanosis, Diaphoresis


Neurological: Reports: Tremors, Gait Disturbance.  Denies: Seizure


Psychiatric: Reports: Confusion, Anxiety.  Denies: Hallucinations, Homicidal 

Ideation


Systems Review Comment:: 


ROS is limited, patient is heavily sedated but arousable.





- Patient Data


Vitals - Most Recent: 


                                Last Vital Signs











Temp  36.3 C   07/16/20 04:00


 


Pulse  79   07/15/20 14:45


 


Resp  20   07/16/20 04:00


 


BP  123/73   07/16/20 04:00


 


Pulse Ox  94 L  07/16/20 04:00











Weight - Most Recent: 101.242 kg


I&O - Last 24 Hours: 


                                 Intake & Output











 07/15/20 07/16/20 07/16/20





 22:59 06:59 14:59


 


Intake Total  471 


 


Output Total 525  


 


Balance -525 471 











Lab Results Last 24 Hours: 


                         Laboratory Results - last 24 hr











  07/15/20 07/15/20 07/15/20 Range/Units





  14:40 14:48 14:48 


 


WBC   11.80 H   (4.23-9.07)  K/mm3


 


RBC   5.76   (4.63-6.08)  M/mm3


 


Hgb   16.2   (13.7-17.5)  gm/dl


 


Hct   47.0   (40.1-51.0)  %


 


MCV   81.6   (79.0-92.2)  fl


 


MCH   28.1   (25.7-32.2)  pg


 


MCHC   34.5   (32.2-35.5)  g/dl


 


RDW Std Deviation   41.0   (35.1-43.9)  fL


 


Plt Count   493 H   (163-337)  K/mm3


 


MPV   8.1 L   (9.4-12.3)  fl


 


Neut % (Auto)   78.6 H   (34.0-67.9)  %


 


Lymph % (Auto)   17.0 L   (21.8-53.1)  %


 


Mono % (Auto)   4.1 L   (5.3-12.2)  %


 


Eos % (Auto)   0 L   (0.8-7.0)  


 


Baso % (Auto)   0.1   (0.1-1.2)  %


 


Neut # (Auto)   9.28 H   (1.78-5.38)  K/mm3


 


Lymph # (Auto)   2.01   (1.32-3.57)  K/mm3


 


Mono # (Auto)   0.48   (0.30-0.82)  K/mm3


 


Eos # (Auto)   0.00 L   (0.04-0.54)  K/mm3


 


Baso # (Auto)   0.01   (0.01-0.08)  K/mm3


 


Sodium    133 L  (136-145)  mEq/L


 


Potassium    4.2  (3.5-5.1)  mEq/L


 


Chloride    98  ()  mEq/L


 


Carbon Dioxide    19 L  (21-32)  mEq/L


 


Anion Gap    20.2 H  (5-15)  


 


BUN    49 H  (7-18)  mg/dL


 


Creatinine    2.6 H  (0.7-1.3)  mg/dL


 


Est Cr Clr Drug Dosing    31.42  mL/min


 


Estimated GFR (MDRD)    26  (>60)  mL/min


 


BUN/Creatinine Ratio    18.8 H  (14-18)  


 


Glucose    172 H  ()  mg/dL


 


Calcium    8.8  (8.5-10.1)  mg/dL


 


Magnesium  2.4    (1.8-2.4)  mg/dl


 


Total Bilirubin    0.6  (0.2-1.0)  mg/dL


 


AST    48 H  (15-37)  U/L


 


ALT    77 H  (16-63)  U/L


 


Alkaline Phosphatase    98  ()  U/L


 


Creatine Kinase     ()  U/L


 


Troponin I    < 0.017  (0.00-0.056)  ng/mL


 


C-Reactive Protein    < 0.2  (<1.0)  mg/dL


 


Total Protein    8.4 H  (6.4-8.2)  g/dl


 


Albumin    4.1  (3.4-5.0)  g/dl


 


Globulin    4.3  gm/dL


 


Albumin/Globulin Ratio    1.0  (1-2)  


 


Urine Color     (Yellow)  


 


Urine Appearance     (Clear)  


 


Urine pH     (5.0-8.0)  


 


Ur Specific Gravity     (1.005-1.030)  


 


Urine Protein     (Negative)  


 


Urine Glucose (UA)     (Negative)  


 


Urine Ketones     (Negative)  


 


Urine Occult Blood     (Negative)  


 


Urine Nitrite     (Negative)  


 


Urine Bilirubin     (Negative)  


 


Urine Urobilinogen     (0.2-1.0)  


 


Ur Leukocyte Esterase     (Negative)  


 


Urine Opiates Screen     (HQGWTV=288)  


 


Ur Buprenorphine Scrn     (CUTOFF=10)  


 


Ur Oxycodone Screen     (FWE7XH=046)  


 


Urine Methadone Screen     (FTJ3NY=964)  


 


Ur Propoxyphene Screen     (AZADLC=664)  


 


Ur Barbiturates Screen     (HKXDVG=937)  


 


Ur Tricyclics Screen     (GCWAOO=603)  


 


Ur Phencyclidine Scrn     (CUTOFF=25)  


 


Ur Amphetamine Screen     (GWMOCO=748)  


 


U Methamphetamines Scrn     (XUCEST=761)  


 


U Benzodiazepines Scrn     (HDTVBO=436)  


 


U Cocaine Metab Screen     (IKNCTJ=724)  


 


U Marijuana (THC) Screen     (CUTOFF=50)  


 


Ethyl Alcohol    0.00  (0.00)  gm%


 


COVID-19 (MILES)     (NEGATIVE)  














  07/15/20 07/15/20 07/15/20 Range/Units





  14:48 17:02 18:30 


 


WBC     (4.23-9.07)  K/mm3


 


RBC     (4.63-6.08)  M/mm3


 


Hgb     (13.7-17.5)  gm/dl


 


Hct     (40.1-51.0)  %


 


MCV     (79.0-92.2)  fl


 


MCH     (25.7-32.2)  pg


 


MCHC     (32.2-35.5)  g/dl


 


RDW Std Deviation     (35.1-43.9)  fL


 


Plt Count     (163-337)  K/mm3


 


MPV     (9.4-12.3)  fl


 


Neut % (Auto)     (34.0-67.9)  %


 


Lymph % (Auto)     (21.8-53.1)  %


 


Mono % (Auto)     (5.3-12.2)  %


 


Eos % (Auto)     (0.8-7.0)  


 


Baso % (Auto)     (0.1-1.2)  %


 


Neut # (Auto)     (1.78-5.38)  K/mm3


 


Lymph # (Auto)     (1.32-3.57)  K/mm3


 


Mono # (Auto)     (0.30-0.82)  K/mm3


 


Eos # (Auto)     (0.04-0.54)  K/mm3


 


Baso # (Auto)     (0.01-0.08)  K/mm3


 


Sodium     (136-145)  mEq/L


 


Potassium     (3.5-5.1)  mEq/L


 


Chloride     ()  mEq/L


 


Carbon Dioxide     (21-32)  mEq/L


 


Anion Gap     (5-15)  


 


BUN     (7-18)  mg/dL


 


Creatinine     (0.7-1.3)  mg/dL


 


Est Cr Clr Drug Dosing     mL/min


 


Estimated GFR (MDRD)     (>60)  mL/min


 


BUN/Creatinine Ratio     (14-18)  


 


Glucose     ()  mg/dL


 


Calcium     (8.5-10.1)  mg/dL


 


Magnesium     (1.8-2.4)  mg/dl


 


Total Bilirubin     (0.2-1.0)  mg/dL


 


AST     (15-37)  U/L


 


ALT     (16-63)  U/L


 


Alkaline Phosphatase     ()  U/L


 


Creatine Kinase  229    ()  U/L


 


Troponin I     (0.00-0.056)  ng/mL


 


C-Reactive Protein     (<1.0)  mg/dL


 


Total Protein     (6.4-8.2)  g/dl


 


Albumin     (3.4-5.0)  g/dl


 


Globulin     gm/dL


 


Albumin/Globulin Ratio     (1-2)  


 


Urine Color    Yellow  (Yellow)  


 


Urine Appearance    Clear  (Clear)  


 


Urine pH    5.0  (5.0-8.0)  


 


Ur Specific Gravity    1.025  (1.005-1.030)  


 


Urine Protein    Negative  (Negative)  


 


Urine Glucose (UA)    Negative  (Negative)  


 


Urine Ketones    1+ H  (Negative)  


 


Urine Occult Blood    Negative  (Negative)  


 


Urine Nitrite    Negative  (Negative)  


 


Urine Bilirubin    Negative  (Negative)  


 


Urine Urobilinogen    0.2  (0.2-1.0)  


 


Ur Leukocyte Esterase    Negative  (Negative)  


 


Urine Opiates Screen     (MEOZDH=288)  


 


Ur Buprenorphine Scrn     (CUTOFF=10)  


 


Ur Oxycodone Screen     (TAS9LK=031)  


 


Urine Methadone Screen     (NTF5NU=985)  


 


Ur Propoxyphene Screen     (OREKGS=613)  


 


Ur Barbiturates Screen     (EEWWED=461)  


 


Ur Tricyclics Screen     (IAKLLW=533)  


 


Ur Phencyclidine Scrn     (CUTOFF=25)  


 


Ur Amphetamine Screen     (RYMBJX=050)  


 


U Methamphetamines Scrn     (REXRHE=426)  


 


U Benzodiazepines Scrn     (JLJIIW=698)  


 


U Cocaine Metab Screen     (LGLNFT=680)  


 


U Marijuana (THC) Screen     (CUTOFF=50)  


 


Ethyl Alcohol     (0.00)  gm%


 


COVID-19 (MILES)   Negative   (NEGATIVE)  














  07/15/20 07/15/20 07/16/20 Range/Units





  18:30 21:04 05:28 


 


WBC    11.65 H  (4.23-9.07)  K/mm3


 


RBC    5.32  (4.63-6.08)  M/mm3


 


Hgb    14.9  (13.7-17.5)  gm/dl


 


Hct    44.7  (40.1-51.0)  %


 


MCV    84.0  (79.0-92.2)  fl


 


MCH    28.0  (25.7-32.2)  pg


 


MCHC    33.3  (32.2-35.5)  g/dl


 


RDW Std Deviation    43.1  (35.1-43.9)  fL


 


Plt Count    339 H D  (163-337)  K/mm3


 


MPV    7.9 L  (9.4-12.3)  fl


 


Neut % (Auto)    70.1 H  (34.0-67.9)  %


 


Lymph % (Auto)    22.3  (21.8-53.1)  %


 


Mono % (Auto)    7.0  (5.3-12.2)  %


 


Eos % (Auto)    0.1 L  (0.8-7.0)  


 


Baso % (Auto)    0.2  (0.1-1.2)  %


 


Neut # (Auto)    8.18 H  (1.78-5.38)  K/mm3


 


Lymph # (Auto)    2.60  (1.32-3.57)  K/mm3


 


Mono # (Auto)    0.81  (0.30-0.82)  K/mm3


 


Eos # (Auto)    0.01 L  (0.04-0.54)  K/mm3


 


Baso # (Auto)    0.02  (0.01-0.08)  K/mm3


 


Sodium   135 L   (136-145)  mEq/L


 


Potassium   4.1   (3.5-5.1)  mEq/L


 


Chloride   100   ()  mEq/L


 


Carbon Dioxide   22   (21-32)  mEq/L


 


Anion Gap   17.1 H   (5-15)  


 


BUN   36 H   (7-18)  mg/dL


 


Creatinine   2.7 H   (0.7-1.3)  mg/dL


 


Est Cr Clr Drug Dosing   30.26   mL/min


 


Estimated GFR (MDRD)   25   (>60)  mL/min


 


BUN/Creatinine Ratio   13.3 L   (14-18)  


 


Glucose   123 H   ()  mg/dL


 


Calcium   8.1 L   (8.5-10.1)  mg/dL


 


Magnesium     (1.8-2.4)  mg/dl


 


Total Bilirubin     (0.2-1.0)  mg/dL


 


AST     (15-37)  U/L


 


ALT     (16-63)  U/L


 


Alkaline Phosphatase     ()  U/L


 


Creatine Kinase     ()  U/L


 


Troponin I     (0.00-0.056)  ng/mL


 


C-Reactive Protein     (<1.0)  mg/dL


 


Total Protein     (6.4-8.2)  g/dl


 


Albumin     (3.4-5.0)  g/dl


 


Globulin     gm/dL


 


Albumin/Globulin Ratio     (1-2)  


 


Urine Color     (Yellow)  


 


Urine Appearance     (Clear)  


 


Urine pH     (5.0-8.0)  


 


Ur Specific Gravity     (1.005-1.030)  


 


Urine Protein     (Negative)  


 


Urine Glucose (UA)     (Negative)  


 


Urine Ketones     (Negative)  


 


Urine Occult Blood     (Negative)  


 


Urine Nitrite     (Negative)  


 


Urine Bilirubin     (Negative)  


 


Urine Urobilinogen     (0.2-1.0)  


 


Ur Leukocyte Esterase     (Negative)  


 


Urine Opiates Screen  Negative    (JAYFKG=327)  


 


Ur Buprenorphine Scrn  Negative    (CUTOFF=10)  


 


Ur Oxycodone Screen  Negative    (YGZ4XM=303)  


 


Urine Methadone Screen  Negative    (LSF2ID=544)  


 


Ur Propoxyphene Screen  Negative    (TJQOKH=085)  


 


Ur Barbiturates Screen  Negative    (CJJERP=820)  


 


Ur Tricyclics Screen  Negative    (XIAMEA=912)  


 


Ur Phencyclidine Scrn  Negative    (CUTOFF=25)  


 


Ur Amphetamine Screen  Negative    (JSUILK=217)  


 


U Methamphetamines Scrn  Negative    (NMJUCW=918)  


 


U Benzodiazepines Scrn  Negative    (TEXLFI=320)  


 


U Cocaine Metab Screen  Negative    (YTVAGW=582)  


 


U Marijuana (THC) Screen  Negative    (CUTOFF=50)  


 


Ethyl Alcohol     (0.00)  gm%


 


COVID-19 (MILES)     (NEGATIVE)  














  07/16/20 Range/Units





  05:28 


 


WBC   (4.23-9.07)  K/mm3


 


RBC   (4.63-6.08)  M/mm3


 


Hgb   (13.7-17.5)  gm/dl


 


Hct   (40.1-51.0)  %


 


MCV   (79.0-92.2)  fl


 


MCH   (25.7-32.2)  pg


 


MCHC   (32.2-35.5)  g/dl


 


RDW Std Deviation   (35.1-43.9)  fL


 


Plt Count   (163-337)  K/mm3


 


MPV   (9.4-12.3)  fl


 


Neut % (Auto)   (34.0-67.9)  %


 


Lymph % (Auto)   (21.8-53.1)  %


 


Mono % (Auto)   (5.3-12.2)  %


 


Eos % (Auto)   (0.8-7.0)  


 


Baso % (Auto)   (0.1-1.2)  %


 


Neut # (Auto)   (1.78-5.38)  K/mm3


 


Lymph # (Auto)   (1.32-3.57)  K/mm3


 


Mono # (Auto)   (0.30-0.82)  K/mm3


 


Eos # (Auto)   (0.04-0.54)  K/mm3


 


Baso # (Auto)   (0.01-0.08)  K/mm3


 


Sodium  138  (136-145)  mEq/L


 


Potassium  4.1  (3.5-5.1)  mEq/L


 


Chloride  103  ()  mEq/L


 


Carbon Dioxide  25  (21-32)  mEq/L


 


Anion Gap  14.1  (5-15)  


 


BUN  24 H  (7-18)  mg/dL


 


Creatinine  2.3 H  (0.7-1.3)  mg/dL


 


Est Cr Clr Drug Dosing  35.52  mL/min


 


Estimated GFR (MDRD)  30  (>60)  mL/min


 


BUN/Creatinine Ratio  10.4 L  (14-18)  


 


Glucose  154 H  ()  mg/dL


 


Calcium  8.0 L  (8.5-10.1)  mg/dL


 


Magnesium  2.0  (1.8-2.4)  mg/dl


 


Total Bilirubin  0.5  (0.2-1.0)  mg/dL


 


AST  42 H  (15-37)  U/L


 


ALT  62  (16-63)  U/L


 


Alkaline Phosphatase  78  ()  U/L


 


Creatine Kinase   ()  U/L


 


Troponin I   (0.00-0.056)  ng/mL


 


C-Reactive Protein   (<1.0)  mg/dL


 


Total Protein  7.6  (6.4-8.2)  g/dl


 


Albumin  3.5  (3.4-5.0)  g/dl


 


Globulin  4.1  gm/dL


 


Albumin/Globulin Ratio  0.9 L  (1-2)  


 


Urine Color   (Yellow)  


 


Urine Appearance   (Clear)  


 


Urine pH   (5.0-8.0)  


 


Ur Specific Gravity   (1.005-1.030)  


 


Urine Protein   (Negative)  


 


Urine Glucose (UA)   (Negative)  


 


Urine Ketones   (Negative)  


 


Urine Occult Blood   (Negative)  


 


Urine Nitrite   (Negative)  


 


Urine Bilirubin   (Negative)  


 


Urine Urobilinogen   (0.2-1.0)  


 


Ur Leukocyte Esterase   (Negative)  


 


Urine Opiates Screen   (SETPLA=387)  


 


Ur Buprenorphine Scrn   (CUTOFF=10)  


 


Ur Oxycodone Screen   (LEQ4VH=899)  


 


Urine Methadone Screen   (HYE7LZ=977)  


 


Ur Propoxyphene Screen   (DDDFEP=416)  


 


Ur Barbiturates Screen   (BINTTN=434)  


 


Ur Tricyclics Screen   (EMFWCF=714)  


 


Ur Phencyclidine Scrn   (CUTOFF=25)  


 


Ur Amphetamine Screen   (TIARRG=057)  


 


U Methamphetamines Scrn   (DTTGML=085)  


 


U Benzodiazepines Scrn   (LSWGFT=636)  


 


U Cocaine Metab Screen   (HZJUAQ=256)  


 


U Marijuana (THC) Screen   (CUTOFF=50)  


 


Ethyl Alcohol   (0.00)  gm%


 


COVID-19 (MILES)   (NEGATIVE)  











Med Orders - Current: 


                               Current Medications





Albuterol/Ipratropium (Duoneb 3.0-0.5 Mg/3 Ml)  3 ml NEB Q4H PRN


   PRN Reason: Shortness Of Breath/wheezing


Bisacodyl (Dulcolax)  5 mg PO DAILY PRN


   PRN Reason: Constipation


Clonidine HCl (Catapres)  0.1 mg PO Q4H PRN


   PRN Reason: Agitation


Docusate Sodium (Colace)  100 mg PO BID PRN


   PRN Reason: Constipation


Folic Acid (Folic Acid)  1 mg PO BEDTIME [facility]


   Last Admin: 07/15/20 20:21 Dose:  1 mg


   Documented by: 


Haloperidol Lactate (Haldol)  2 mg IM Q4H PRN


   PRN Reason: Agitation


Sodium Chloride (Normal Saline)  1,000 mls @ 999 mls/hr IV ASDIRECTED [facility]


   Last Admin: 07/15/20 15:29 Dose:  999 mls/hr


   Documented by: 


Sodium Chloride (Normal Saline)  1,000 mls @ 999 mls/hr IV ASDIRECTED [facility]


   Last Admin: 07/15/20 17:29 Dose:  999 mls/hr


   Documented by: 


Promethazine HCl 12.5 mg/ (Sodium Chloride)  50.5 mls @ 100 mls/hr IV Q6H PRN


   PRN Reason: Nausea/Vomiting


   Last Admin: 07/15/20 20:30 Dose:  100 mls/hr


   Documented by: 


Dextrose/Sodium Chloride (Dextrose 5%-1/2 Ns)  1,000 mls @ 100 mls/hr IV 

ASDIRECTED [facility]


   Last Admin: 07/15/20 23:23 Dose:  100 mls/hr


   Documented by: 


Lorazepam (Ativan)  0 mg IVPUSH Q20M PRN; Protocol


   PRN Reason: Withdrawal Symptoms


   Last Admin: 07/16/20 05:42 Dose:  2 mg


   Documented by: 


Metoprolol Succinate (Toprol Xl)  50 mg PO DAILY [facility]


Metoprolol Tartrate (Lopressor)  5 mg IVPUSH Q4H PRN


   PRN Reason: Tachycardia


Multivitamins (Thera)  1 each PO BEDTIME [facility]


   Last Admin: 07/15/20 20:21 Dose:  1 each


   Documented by: 


Ondansetron HCl (Zofran)  4 mg IV Q6H PRN


   PRN Reason: Nausea/Vomiting


Pantoprazole Sodium (Protonix***)  40 mg PO DAILY [facility]


Quetiapine Fumarate (Seroquel)  50 mg PO BEDTIME [facility]


   Last Admin: 07/15/20 20:21 Dose:  50 mg


   Documented by: 


Senna/Docusate Sodium (Senna Plus)  1 tab PO BID PRN


   PRN Reason: Constipation


Sodium Chloride (Saline Flush)  10 ml FLUSH ASDIRECTED PRN


   PRN Reason: Keep Vein Open


   Last Admin: 07/15/20 15:31 Dose:  10 ml


   Documented by: 


Thiamine HCl (Vitamin B-1)  100 mg PO BEDTIME [facility]


   Last Admin: 07/15/20 20:21 Dose:  100 mg


   Documented by: 


Topiramate (Topamax)  25 mg PO BID [facility]


   Last Admin: 07/15/20 20:21 Dose:  25 mg


   Documented by: 





Discontinued Medications





Chlordiazepoxide HCl (Librium)  75 mg PO ONETIME ONE


   Stop: 07/15/20 20:54


   Last Admin: 07/15/20 20:57 Dose:  75 mg


   Documented by: 


Chlordiazepoxide HCl (Librium)  50 mg PO Q4H [facility]


   Stop: 07/16/20 18:01


   Last Admin: 07/16/20 02:31 Dose:  Not Given


   Documented by: 


Chlordiazepoxide HCl (Librium)  50 mg PO Q6H [facility]


   Stop: 07/17/20 18:01


Chlordiazepoxide HCl (Librium)  25 mg PO Q4H [facility]


   Stop: 07/18/20 20:01


Chlordiazepoxide HCl (Librium)  25 mg PO Q6H [facility]


   Stop: 07/19/20 18:01


Diphenhydramine HCl (Benadryl)  25 mg IVPUSH ONETIME ONE


   Stop: 07/15/20 19:02


   Last Admin: 07/15/20 20:22 Dose:  25 mg


   Documented by: 


Lorazepam (Ativan)  0 mg IVPUSH Q20M PRN; Protocol


   PRN Reason: Withdrawal Symptoms


   Last Admin: 07/15/20 21:22 Dose:  2 mg


   Documented by: 


Lorazepam (Ativan)  0 mg IVPUSH Q2H PRN; Protocol


   PRN Reason: Withdrawal Symptoms


Ondansetron HCl (Zofran)  4 mg IVPUSH ONETIME ONE


   Stop: 07/15/20 15:18


   Last Admin: 07/15/20 15:29 Dose:  4 mg


   Documented by: 











- Exam


General: Sedated


HEENT: Pupils Equal, Pupils Reactive


Neck: Supple


Lungs: Clear to Auscultation, Normal Respiratory Effort


Cardiovascular: Regular Rhythm, Tachycardia


GI/Abdominal Exam: Normal Bowel Sounds, Soft, Non-Tender, No Organomegaly, Other

(Obese)


 (Male) Exam: Deferred


Extremities: Normal Inspection, Normal Range of Motion, Non-Tender, No Pedal 

Edema


Peripheral Pulses: 2+: Dorsalis Pedis (L), Dorsalis Pedis (R)


Skin: Warm, Dry, Intact


Neurological: Other (sedated)


Psy/Mental Status: Withdrawal Symptoms


Physical Findings Comments:: 


Physical exam is limited due to heavy sedation. His O2 sat is stable. He is 

tachypneic and tachycardic but afebrile. 





Sepsis Event Note





- Evaluation


Sepsis Screening Result: No Definite Risk





- Focused Exam


Vital Signs: 


                                   Vital Signs











  Temp Resp BP Pulse Ox


 


 07/16/20 04:00  36.3 C  20  123/73  94 L


 


 07/16/20 00:00  36.7 C  21 H  158/81 H  94 L











Date Exam was Performed: 07/16/20


Time Exam was Performed: 19:17





- Problem List Review


Problem List Initiated/Reviewed/Updated: Yes





- My Orders


Last 24 Hours: 


My Active Orders





07/15/20 Dinner


Regular Diet [DIET] 





07/15/20 18:50


Height and Weight [RC] 0400 


Oxygen Therapy [RC] .PRN 


Up With Assistance [RC] ASDIRECTED 


VTE/DVT Education [RC] 09,21 


Vital Signs [RC] Q4HR 


Consult to Case Management/ [CONS] Routine 


Consult to Physician [CONS] Routine 


Albuterol/Ipratropium [DuoNeb 3.0-0.5 MG/3 ML]   3 ml NEB Q4H PRN 


Docusate Sodium [Colace]   100 mg PO BID PRN 


Docusate Sodium/Sennosides [Senna Plus]   1 tab PO BID PRN 


Ondansetron [Zofran]   4 mg IV Q6H PRN 


Promethazine [Phenergan] 12.5 mg   Sodium Chloride 0.9% [Normal Saline] 50 ml IV

Q6H 


bisacodyL [Dulcolax]   5 mg PO DAILY PRN 


Resuscitation Status Routine 





07/15/20 18:51


Intake and Output [RC] 04,16 





07/15/20 18:52


Sequential Compression Device [OM.PC] Per Unit Routine 





07/15/20 18:57


Antiembolic Devices [RC] 09,21 





07/15/20 18:58


Notify Provider Consults [RC] ASDIRECTED 





07/15/20 19:02


CIWAA Assessment [RC] Q1HR 


Notify Provider [RC] .PRN 


Haloperidol Lactate [Haldol]   2 mg IM Q4H PRN 


cloNIDine [Catapres]   0.1 mg PO Q4H PRN 





07/15/20 19:03


Metoprolol Tartrate [Lopressor]   5 mg IVPUSH Q4H PRN 





07/15/20 21:00


Folic Acid   1 mg PO BEDTIME 


Multivitamins,Therapeutic [Thera]   1 each PO BEDTIME 


QUEtiapine [SEROqueL]   50 mg PO BEDTIME 


Thiamine [Vitamin B-1]   100 mg PO BEDTIME 


Topiramate [Topamax]   25 mg PO BID 





07/16/20 09:00


Metoprolol Succinate [Toprol XL]   50 mg PO DAILY 


Pantoprazole [ProTONIX***]   40 mg PO DAILY 





07/17/20 05:11


CBC WITH AUTO DIFF [HEME] AM 


COMPREHENSIVE METABOLIC PN,CMP [CHEM] AM 


MAGNESIUM [CHEM] AM 





07/18/20 05:11


COMPREHENSIVE METABOLIC PN,CMP [CHEM] AM 


MAGNESIUM [CHEM] AM 














- Plan


Plan:: 


This is a 53 yo white male with past medical hx/o chronic alcoholism, 

hypertension, hyperlipidemia, GERD, and depression who was brought in by his 

wife due to acute confusion with withdrawal symptoms.





Assessment:


Acute:


* ETOH Abuse/Withdrawal Symptoms. He carries a hx/o chronic alcoholism. He is 

  not very forth coming and it appears he hides his drinking from his family. he

   drinks hard liquor. His wife found 1.25L of Vodka. Per family, his drinking 

  has gotten out of hand since January. CIWA is bet 7-10.





* Acute confusion 2/2 metabolic/toxic encephalopathy. Has abnormal e-lytes and 

  UA/UDS pending. Head CT scan and Brain MRI done in ED are both negative for 

  acute abnormal findings. This could be mainly to alcohol abuse. We will 

  monitor.





* Acute kidney injury. This is likely 2/2 volume depletion and dehydration from 

  etoh abuse. No previous level for comparison. he comes in with a CR of 2.6 

  with a BUN of 49. So far he has received 2L of NS in ED. It appears he might 

  have an underlying CKD. Had D5-1/2 NS overnight. We will switch to 1L NS at 

  100/hr. Renal U/S report read as dilated left renal pelvis: uncertain if due 

  to functional UPJ obstruction or hydronephrosis, both resistivity indices are 

  normal within both kidneys. Continue to avoid nephrotoxic agents, monitor 

  Is/Os, rand renal panel in AM.





* Hypovolemia Hyperosmolar Hyponatremia. This is likely due to etoh abuse and 

  not able to eat of drink adequately. He is volume depleted. he comes in with a

   NA level of 133. Although this could be affected by his HCTZ and Zoloft he 

  takes for maintenance medications. Resolved. Continue to hold HCTZ.





* Increased AG Metabolic Acidosis. This is 2/2 ETOH Abuse. he is afebrile and no

   signs of systemic infection. Expect to improve with hydration. Resolved.





* Hyperglycemia. He comes to us with BS level of 172. He carries no hx/o glucose

   intolerance or diabetes. Improved. We will monitor.





* Transaminitis. He comes with AST and ALT levels of  respectively. This is 

  likely due to etoh abuse. Improved. May resume statin in AM. 





* Mild Leukocytosis. This is likely due to stress. He is afebrile w/o signs of 

  systemic infection. About the same. Afebrile and UA is negative. We will 

  continue to monitor.





* Mild Thrombocytopenia. He comes in with an elevated platelet level of 493. Thi

  s is due to chronic alcohol use. Improved. We will continue to monitor.





* Acute urinary retention. Had > 700 ml of urine residual after abbott cath was 

  placed in. U/S ordered. No hx/o enlarged prostate.





* Obesity Class II. Carries a BMI of 33.9. Dietician consult for weight 

  management.





* Mild Proteinuria. This is likely due to his long standing hypertension. He was

   on hctz/lisinopril for maintenance medications. If not changes on his renal 

  panel, we may be able to resume BP meds in AM.





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 Findings on renal 

  US. He has no back pain or acute urinary complaints. UA is clean for 

  infection. Sodium, AG, and CO2 improved. No hematuria on catheter bag. 

  Consider abdominal CT scan w/o contrast to r/o mass. VCUG is a consideration 

  but would need contrast. Will obtain chart from PCP to check if he has chronic

   renal insufficiency.


 


Chronic: 


* HTN


* HLD


* GERD


* Depression


* Chronic Alcoholism





Plan: Continue CIWAA protocol. Thiamine, folic acid and MVI. IV fluid for 

hydration. Renal U/S in AM. Avoid nephrotxic agents. Regular diet. DVT/GI 

Prophylaxis. Tele-psych consult with Dr. Muñiz when appropriate. SW/CM for 

discharge planning. Spoke to his wife this morning and updated him about what 

went on overnight and his clinical progress.

## 2020-07-16 NOTE — US
Renal ultrasound: Multiple real-time images of the kidneys were 

obtained.

 

Left renal pelvis is mildly dilated.  Right kidney shows no 

hydronephrosis.  Kidneys show no cyst or solid abnormality.  Right 

kidney length is 12.8 cm and left kidney length is 12.5 cm.  

Resistivity indices are normal within both kidneys.  Garcia catheter is

 noted within the bladder.

 

Impression:

1.  Dilated left renal pelvis.  Uncertain if this is due to functional

 UPJ obstruction or represents hydronephrosis.

2.  No additional abnormality is seen on renal ultrasound exam.

 

Diagnostic code #3

 

This report was dictated in MDT

## 2020-07-17 RX ADMIN — THERA TABS SCH: TAB at 21:39

## 2020-07-17 RX ADMIN — LORAZEPAM PRN MG: 2 INJECTION INTRAMUSCULAR; INTRAVENOUS at 15:46

## 2020-07-17 RX ADMIN — THERA TABS SCH EACH: TAB at 21:23

## 2020-07-17 RX ADMIN — LORAZEPAM PRN MG: 2 INJECTION INTRAMUSCULAR; INTRAVENOUS at 23:13

## 2020-07-17 RX ADMIN — THERA TABS SCH EACH: TAB at 21:05

## 2020-07-17 RX ADMIN — HEPARIN SODIUM SCH UNITS: 5000 INJECTION, SOLUTION INTRAVENOUS; SUBCUTANEOUS at 21:01

## 2020-07-17 RX ADMIN — LORAZEPAM PRN MG: 2 INJECTION INTRAMUSCULAR; INTRAVENOUS at 14:06

## 2020-07-17 RX ADMIN — LORAZEPAM PRN MG: 2 INJECTION INTRAMUSCULAR; INTRAVENOUS at 23:52

## 2020-07-17 RX ADMIN — HEPARIN SODIUM SCH UNITS: 5000 INJECTION, SOLUTION INTRAVENOUS; SUBCUTANEOUS at 13:24

## 2020-07-17 RX ADMIN — LORAZEPAM PRN MG: 2 INJECTION INTRAMUSCULAR; INTRAVENOUS at 19:59

## 2020-07-17 RX ADMIN — HEPARIN SODIUM SCH UNITS: 5000 INJECTION, SOLUTION INTRAVENOUS; SUBCUTANEOUS at 04:53

## 2020-07-17 RX ADMIN — LORAZEPAM PRN MG: 2 INJECTION INTRAMUSCULAR; INTRAVENOUS at 21:40

## 2020-07-17 NOTE — PCM.PN
- General Info


Date of Service: 07/17/20


Admission Dx/Problem (Free Text): 


ETOH Withdrawal


Subjective Update: 





7/17/20: Patient detoxed hard last night. He was getting ativan round the clock 

starting at 11 pm. He is comfortably resting this morning. His CIWAA is bet 7-

10. His UA and UDS were negative. 


Functional Status: Reports: Pain Controlled





- Review of Systems


General: Denies: Fever, Chills


Pulmonary: Denies: Shortness of Breath


Cardiovascular: Denies: Chest Pain


Gastrointestinal: Denies: Abdominal Pain, Nausea, Vomiting


Neurological: Reports: Confusion, Tremors.  Denies: Seizure, Syncope


Psychiatric: Denies: Anxiety, Hallucinations


Systems Review Comment:: 


ROS obtained from night nurse. Patient heavily sedated. He has been asleep since

2100 last night. CIWAA score runs from 3-5. His labs were fairly unremarkable.





- Patient Data


Vitals - Most Recent: 


                                Last Vital Signs











Temp  36.5 C   07/17/20 04:00


 


Pulse  70   07/17/20 06:00


 


Resp  23 H  07/17/20 06:00


 


BP  135/78   07/17/20 04:00


 


Pulse Ox  69 L  07/17/20 06:00











Weight - Most Recent: 101.378 kg


I&O - Last 24 Hours: 


                                 Intake & Output











 07/16/20 07/17/20 07/17/20





 22:59 06:59 14:59


 


Intake Total 1810 1348 


 


Output Total 900 430 


 


Balance 910 918 











Lab Results Last 24 Hours: 


                         Laboratory Results - last 24 hr











  07/16/20 07/16/20 07/16/20 Range/Units





  08:05 08:05 08:55 


 


WBC     (4.23-9.07)  K/mm3


 


RBC     (4.63-6.08)  M/mm3


 


Hgb     (13.7-17.5)  gm/dl


 


Hct     (40.1-51.0)  %


 


MCV     (79.0-92.2)  fl


 


MCH     (25.7-32.2)  pg


 


MCHC     (32.2-35.5)  g/dl


 


RDW Std Deviation     (35.1-43.9)  fL


 


Plt Count     (163-337)  K/mm3


 


MPV     (9.4-12.3)  fl


 


Neut % (Auto)     (34.0-67.9)  %


 


Lymph % (Auto)     (21.8-53.1)  %


 


Mono % (Auto)     (5.3-12.2)  %


 


Eos % (Auto)     (0.8-7.0)  


 


Baso % (Auto)     (0.1-1.2)  %


 


Neut # (Auto)     (1.78-5.38)  K/mm3


 


Lymph # (Auto)     (1.32-3.57)  K/mm3


 


Mono # (Auto)     (0.30-0.82)  K/mm3


 


Eos # (Auto)     (0.04-0.54)  K/mm3


 


Baso # (Auto)     (0.01-0.08)  K/mm3


 


Ur Random Creatinine  106.5  103.7   (30.0-125.0)  mg/dL


 


U Random Total Protein  22.2 H    (0.0-11.8)  mg/dL


 


Ur Random Sodium   83   ()  mEq/L


 


Protein/Creatinin Ratio  208.5 H    (0-149)  mg/g


 


Ur Potassium Concent    27.0  mEq/L


 


U Phosphorus Concen     mg/dL














  07/16/20 07/17/20 Range/Units





  08:55 04:37 


 


WBC   8.58  (4.23-9.07)  K/mm3


 


RBC   5.09  (4.63-6.08)  M/mm3


 


Hgb   14.0  (13.7-17.5)  gm/dl


 


Hct   43.9  (40.1-51.0)  %


 


MCV   86.2  (79.0-92.2)  fl


 


MCH   27.5  (25.7-32.2)  pg


 


MCHC   31.9 L  (32.2-35.5)  g/dl


 


RDW Std Deviation   44.0 H  (35.1-43.9)  fL


 


Plt Count   266  (163-337)  K/mm3


 


MPV   8.3 L  (9.4-12.3)  fl


 


Neut % (Auto)   63.2  (34.0-67.9)  %


 


Lymph % (Auto)   28.1  (21.8-53.1)  %


 


Mono % (Auto)   8.0  (5.3-12.2)  %


 


Eos % (Auto)   0.3 L  (0.8-7.0)  


 


Baso % (Auto)   0.2  (0.1-1.2)  %


 


Neut # (Auto)   5.41 H  (1.78-5.38)  K/mm3


 


Lymph # (Auto)   2.41  (1.32-3.57)  K/mm3


 


Mono # (Auto)   0.69  (0.30-0.82)  K/mm3


 


Eos # (Auto)   0.03 L  (0.04-0.54)  K/mm3


 


Baso # (Auto)   0.02  (0.01-0.08)  K/mm3


 


Ur Random Creatinine    (30.0-125.0)  mg/dL


 


U Random Total Protein    (0.0-11.8)  mg/dL


 


Ur Random Sodium    ()  mEq/L


 


Protein/Creatinin Ratio    (0-149)  mg/g


 


Ur Potassium Concent    mEq/L


 


U Phosphorus Concen  65.4   mg/dL











Med Orders - Current: 


                               Current Medications





Albuterol/Ipratropium (Duoneb 3.0-0.5 Mg/3 Ml)  3 ml NEB Q4H PRN


   PRN Reason: Shortness Of Breath/wheezing


Bisacodyl (Dulcolax)  5 mg PO DAILY PRN


   PRN Reason: Constipation


Clonidine HCl (Catapres)  0.1 mg PO Q4H PRN


   PRN Reason: Agitation


   Last Admin: 07/16/20 20:08 Dose:  0.1 mg


   Documented by: 


Docusate Sodium (Colace)  100 mg PO BID PRN


   PRN Reason: Constipation


Folic Acid (Folic Acid)  1 mg PO BEDTIME [facility]


   Last Admin: 07/16/20 20:09 Dose:  1 mg


   Documented by: 


Haloperidol Lactate (Haldol)  2 mg IM Q4H PRN


   PRN Reason: Agitation


Heparin Sodium (Porcine) (Heparin Sodium)  5,000 units SUBCUT Q8H [facility]


   Last Admin: 07/17/20 04:53 Dose:  5,000 units


   Documented by: 


Promethazine HCl 12.5 mg/ (Sodium Chloride)  50.5 mls @ 100 mls/hr IV Q6H PRN


   PRN Reason: Nausea/Vomiting


   Last Admin: 07/15/20 20:30 Dose:  100 mls/hr


   Documented by: 


Sodium Chloride (Normal Saline)  1,000 mls @ 100 mls/hr IV ASDIRECTED [facility]


   Last Admin: 07/17/20 04:52 Dose:  100 mls/hr


   Documented by: 


Lorazepam (Ativan)  0 mg IVPUSH Q1H PRN; Protocol


   PRN Reason: Withdrawal Symptoms


   Last Admin: 07/16/20 20:11 Dose:  2 mg


   Documented by: 


Metoprolol Succinate (Toprol Xl)  50 mg PO DAILY [facility]


   Last Admin: 07/16/20 08:31 Dose:  50 mg


   Documented by: 


Metoprolol Tartrate (Lopressor)  5 mg IVPUSH Q4H PRN


   PRN Reason: Tachycardia


Multivitamins (Thera)  1 each PO BEDTIME [facility]


   Last Admin: 07/16/20 20:09 Dose:  1 each


   Documented by: 


Ondansetron HCl (Zofran)  4 mg IV Q6H PRN


   PRN Reason: Nausea/Vomiting


Pantoprazole Sodium (Protonix***)  40 mg PO DAILY [facility]


   Last Admin: 07/16/20 08:30 Dose:  40 mg


   Documented by: 


Quetiapine Fumarate (Seroquel)  50 mg PO BEDTIME [facility]


   Last Admin: 07/16/20 20:09 Dose:  50 mg


   Documented by: 


Senna/Docusate Sodium (Senna Plus)  1 tab PO BID PRN


   PRN Reason: Constipation


Sodium Chloride (Saline Flush)  10 ml FLUSH ASDIRECTED PRN


   PRN Reason: Keep Vein Open


   Last Admin: 07/15/20 15:31 Dose:  10 ml


   Documented by: 


Thiamine HCl (Vitamin B-1)  100 mg PO BEDTIME [facility]


   Last Admin: 07/16/20 20:09 Dose:  100 mg


   Documented by: 


Topiramate (Topamax)  25 mg PO BID [facility]


   Last Admin: 07/16/20 20:09 Dose:  25 mg


   Documented by: 





Discontinued Medications





Chlordiazepoxide HCl (Librium)  75 mg PO ONETIME ONE


   Stop: 07/15/20 20:54


   Last Admin: 07/15/20 20:57 Dose:  75 mg


   Documented by: 


Chlordiazepoxide HCl (Librium)  50 mg PO Q4H [facility]


   Stop: 07/16/20 18:01


   Last Admin: 07/16/20 02:31 Dose:  Not Given


   Documented by: 


Chlordiazepoxide HCl (Librium)  50 mg PO Q6H [facility]


   Stop: 07/17/20 18:01


Chlordiazepoxide HCl (Librium)  25 mg PO Q4H [facility]


   Stop: 07/18/20 20:01


Chlordiazepoxide HCl (Librium)  25 mg PO Q6H [facility]


   Stop: 07/19/20 18:01


Diphenhydramine HCl (Benadryl)  25 mg IVPUSH ONETIME ONE


   Stop: 07/15/20 19:02


   Last Admin: 07/15/20 20:22 Dose:  25 mg


   Documented by: 


Diphenhydramine HCl (Benadryl)  25 mg IVPUSH ONETIME ONE


   Stop: 07/16/20 21:01


   Last Admin: 07/16/20 20:51 Dose:  25 mg


   Documented by: 


Sodium Chloride (Normal Saline)  1,000 mls @ 999 mls/hr IV ASDIRECTED [facility]


   Last Admin: 07/15/20 15:29 Dose:  999 mls/hr


   Documented by: 


Sodium Chloride (Normal Saline)  1,000 mls @ 999 mls/hr IV ASDIRECTED [facility]


   Last Admin: 07/15/20 17:29 Dose:  999 mls/hr


   Documented by: 


Dextrose/Sodium Chloride (Dextrose 5%-1/2 Ns)  1,000 mls @ 100 mls/hr IV 

ASDIRECTED [facility]


   Last Admin: 07/15/20 23:23 Dose:  100 mls/hr


   Documented by: 


Sodium Chloride (Normal Saline)  1,000 mls @ 50 mls/hr IV ASDIRECTED [facility]


Lorazepam (Ativan)  0 mg IVPUSH Q20M PRN; Protocol


   PRN Reason: Withdrawal Symptoms


   Last Admin: 07/15/20 21:22 Dose:  2 mg


   Documented by: 


Lorazepam (Ativan)  0 mg IVPUSH Q2H PRN; Protocol


   PRN Reason: Withdrawal Symptoms


Lorazepam (Ativan)  0 mg IVPUSH Q20M PRN; Protocol


   PRN Reason: Withdrawal Symptoms


   Last Admin: 07/16/20 08:31 Dose:  2 mg


   Documented by: 


Ondansetron HCl (Zofran)  4 mg IVPUSH ONETIME ONE


   Stop: 07/15/20 15:18


   Last Admin: 07/15/20 15:29 Dose:  4 mg


   Documented by: 











- Exam


General: Sedated


HEENT: Pupils Equal, Pupils Reactive


Neck: Supple


Lungs: Clear to Auscultation, Normal Respiratory Effort


Cardiovascular: Regular Rate, Regular Rhythm


GI/Abdominal Exam: Normal Bowel Sounds, Soft, Non-Tender, No Organomegaly, Other

(Obese)


 (Male) Exam: Deferred


Back Exam: Normal Inspection


Extremities: Normal Inspection, Non-Tender, No Pedal Edema


Peripheral Pulses: 2+: Dorsalis Pedis (L), Dorsalis Pedis (R)


Skin: Warm, Dry, Intact


Psy/Mental Status: No: Withdrawal Symptoms


Physical Findings Comments:: 


Physical exam is limited due to sedation. He is arousable however with intact 

air way.





Sepsis Event Note





- Evaluation


Sepsis Screening Result: No Definite Risk





- Focused Exam


Vital Signs: 


                                   Vital Signs











  Temp Pulse Resp BP BP Pulse Ox Pulse Ox


 


 07/17/20 06:00   70  23 H    69 L 


 


 07/17/20 05:00   63  17    89 L 


 


 07/17/20 04:00  36.5 C   24 H   135/78  95 


 


 07/17/20 03:00   75  24 H    92 L 


 


 07/17/20 02:00   71  22 H    97 


 


 07/17/20 01:00   77  23 H    98 


 


 07/17/20 00:00  36.4 C   22 H   126/81  94 L 


 


 07/16/20 23:00   78  22 H    97 


 


 07/16/20 22:00   78  24 H    96 


 


 07/16/20 21:41        96


 


 07/16/20 21:00   78  23 H    92 L 


 


 07/16/20 20:08     133/88   


 


 07/16/20 20:01   84   119/87   94 L 


 


 07/16/20 20:00  37.0 C   20   133/88  95 











Date Exam was Performed: 07/18/20


Time Exam was Performed: 08:08





- Problem List Review


Problem List Initiated/Reviewed/Updated: Yes





- My Orders


Last 24 Hours: 


My Active Orders





07/16/20 08:15


Insert Abbott Catheter [Insert Urinary Catheter] [OM.PC] Q24H 





07/16/20 08:17


Urinary Catheter Assessment [RC] Q4H 





07/16/20 08:55


PHOSPHORUS, URINE Routine 


POTASSIUM, URINE Routine 





07/16/20 09:00


Metoprolol Succinate [Toprol XL]   50 mg PO DAILY 


Pantoprazole [ProTONIX***]   40 mg PO DAILY 


Sodium Chloride 0.9% [Normal Saline] 1,000 ml IV ASDIRECTED 





07/16/20 10:22


Consult for Substance Abuse [CONS] Routine 


Consult to Dietary [Consult to Dietician] [CONS] Routine 





07/16/20 13:30


Heparin Sodium   5,000 units SUBCUT Q8H 





07/17/20 04:37


COMPREHENSIVE METABOLIC PN,CMP [CHEM] AM 


MAGNESIUM [CHEM] AM 





07/18/20 05:11


COMPREHENSIVE METABOLIC PN,CMP [CHEM] AM 


MAGNESIUM [CHEM] AM 





07/20/20 07:00


CBC W/O DIFF,HEMOGRAM [HEME] MOTH@0700 07/23/20 07:00


CBC W/O DIFF,HEMOGRAM [HEME] MOTH@0700 07/27/20 07:00


CBC W/O DIFF,HEMOGRAM [HEME] MOTH@0700 07/30/20 07:00


CBC W/O DIFF,HEMOGRAM [HEME] MOTH@0700 08/03/20 07:00


CBC W/O DIFF,HEMOGRAM [HEME] MOTH@0700 08/06/20 07:00


CBC W/O DIFF,HEMOGRAM [HEME] MOTH@0700 














- Plan


Plan:: 


This is a 55 yo white male with past medical hx/o chronic alcoholism, 

hypertension, hyperlipidemia, GERD, and depression who was brought in by his 

wife due to acute confusion with withdrawal symptoms.





Assessment:


Acute:


* ETOH Abuse/Withdrawal Symptoms. He carries a hx/o chronic alcoholism. He is 

  not very forth coming and it appears he hides his drinking from his family. he

   drinks hard liquor. His wife found 1.25L of Vodka. Per family, his drinking 

  has gotten out of hand since January. CIWAA score is bet 3-5. Continue CIWAA 

  protocol.





* Acute confusion 2/2 metabolic/toxic encephalopathy. Has abnormal e-lytes and 

  UA/UDS pending. Head CT scan and Brain MRI done in ED are both negative for 

  acute abnormal findings. This could be mainly to alcohol abuse. He remains 

  confused. We will continue monitor.





* Acute on chronic CKD. This is likely 2/2 volume depletion and dehydration from

   etoh abuse. No previous level for comparison. He comes in with a CR of 2.6 

  with a BUN of 49. So far he has received 2L of NS in ED. It appears he might 

  have an underlying CKD. Had D5-1/2 NS overnight. We will switch to 1L NS at 

  100/hr. Renal U/S report read as dilated left renal pelvis: uncertain if due 

  to functional UPJ obstruction or hydronephrosis, both resistivity indices are 

  normal within both kidneys. Continue to avoid nephrotoxic agents, monitor 

  Is/Os, and daily labs.





* Acute urinary retention. Had > 700 ml of urine residual after abbott cath was 

  placed in. No hx/o enlarged prostate. Will continue with abbott cath until he 

  is fully alert and awake. 





* Obesity Class II. Carries a BMI of 33.9. Dietician consult for weight 

  management.





* Mild Proteinuria. This is likely due to his long standing hypertension. He was

   on hctz/lisinopril for maintenance medications. If no changes on his renal 

  panel, we may be able to resume lisinopril in AM.





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 Findings on renal 

  US. He has no back pain or acute urinary complaints. UA is clean for 

  infection. Sodium, AG, and CO2 improved. No hematuria on catheter bag. VCUG is

   a consideration but would need contrast. Will obtain chart from PCP to check 

  if he has chronic renal insufficiency. Abdominal CT scan report read as 

  cortical cyst within the left kidney causing the questioned dilated renal 

  pelvis on recent U/S. No hydronephrosis seen. No renal calculi or ureteral 

  calculi seen. No additional management needed at this point. This can be 

  followed up outpatient.





* Hypocalcemia. He came to us with an initial level of 8.1. Slightly improved to

   8.3. He has not had oral intake since admission due to withdrawal symptoms. 

  He has been sleeping pretty. Expect to improve once he starts eating and 

  drinking.





* Fatty Liver infiltrate. Abnormal finding noted on Abdominal/Pelvis CT scan. 

  This is likely due to chronic alcoholism. Dietician's consult for proper diet.

   Will  on LSM once fully alert and awake. 





Resolved: 


* Hypovolemia Hyperosmolar Hyponatremia. This is likely due to etoh abuse and 

  not able to eat of drink adequately. He is volume depleted. he comes in with a

   NA level of 133. Although this could be affected by his HCTZ and Zoloft he 

  takes for maintenance medications. Resolved. Continue to hold HCTZ.





* Increased AG Metabolic Acidosis. This is 2/2 ETOH Abuse. he is afebrile and no

   signs of systemic infection. Expect to improve with hydration. Resolved.





* Hyperglycemia. He comes to us with BS level of 172. He carries no hx/o glucose

   intolerance or diabetes. Now back to normal range.





* Transaminitis. He comes with AST and ALT levels of  respectively. This is 

  likely due to etoh abuse. Now back to normal range. 





* Mild Leukocytosis. This is likely due to stress. He is afebrile w/o signs of 

  systemic infection. Afebrile and UA is negative. Now back to normal range. 





* Mild Thrombocytopenia. He comes in with an elevated platelet level of 493. 

  This is due to chronic alcohol use. Now back to normal range. 


 


Chronic: 


* HTN


* HLD


* GERD


* Depression


* Chronic Alcoholism





Plan: Patient is still in withdrawal. Will continue CIWAA protocol. Thiamine, 

folic acid and MVI. IV fluid for hydration. Continue to avoid nephrotoxic 

agents. Regular diet once fully alert and awake. DVT/GI Prophylaxis. Tele-psych 

consult with Dr. Muñiz when appropriate. YEHUDA/NATHANIEL for discharge planning. Spoke to 

his wife this morning and updated her about his clinical progress. I went over 

the results of his CT scan. Wife wants him to go to [facility] to rehab once 

medically cleared. Will relay info to YEHUDA/NATHANIEL for d/c planning. Informed wife 

about the scarring within the right lung base.





Critical care time spent: > 35 mins

## 2020-07-18 RX ADMIN — LORAZEPAM PRN MG: 2 INJECTION INTRAMUSCULAR; INTRAVENOUS at 01:57

## 2020-07-18 RX ADMIN — HEPARIN SODIUM SCH UNITS: 5000 INJECTION, SOLUTION INTRAVENOUS; SUBCUTANEOUS at 05:52

## 2020-07-18 RX ADMIN — HEPARIN SODIUM SCH UNITS: 5000 INJECTION, SOLUTION INTRAVENOUS; SUBCUTANEOUS at 13:09

## 2020-07-18 RX ADMIN — THERA TABS SCH EACH: TAB at 21:36

## 2020-07-18 RX ADMIN — LORAZEPAM PRN MG: 2 INJECTION INTRAMUSCULAR; INTRAVENOUS at 21:34

## 2020-07-18 RX ADMIN — LORAZEPAM PRN MG: 2 INJECTION INTRAMUSCULAR; INTRAVENOUS at 23:55

## 2020-07-18 RX ADMIN — LORAZEPAM PRN MG: 2 INJECTION INTRAMUSCULAR; INTRAVENOUS at 00:59

## 2020-07-18 RX ADMIN — LORAZEPAM PRN MG: 2 INJECTION INTRAMUSCULAR; INTRAVENOUS at 03:56

## 2020-07-18 RX ADMIN — LORAZEPAM PRN MG: 2 INJECTION INTRAMUSCULAR; INTRAVENOUS at 05:50

## 2020-07-18 RX ADMIN — HEPARIN SODIUM SCH UNITS: 5000 INJECTION, SOLUTION INTRAVENOUS; SUBCUTANEOUS at 23:10

## 2020-07-18 NOTE — PCM.PN
- General Info


Date of Service: 07/18/20


Admission Dx/Problem (Free Text): 


ETOH Withdrawal


Subjective Update: 





7/18/20: Patient is still in withdrawal. He is confused and having visual 

hallucinations. He has received haldol and ativan round the clock overnight. He 

ate a small piece of sandwich at about 5 PM but nothing last night. He is 

however awake but not fully alert and able to engage (i.e. talk ) this morning. 

Updated his wife about his clinical progress. His Mg is down to1.7 and his renal

panel improved. His CIWAA is bet 9-15.





7/17/20: Patient detoxed hard last night. He was getting ativan round the clock 

starting at 11 pm. He is comfortably resting this morning. His CIWAA is bet 7-

10. His UA and UDS were negative. 


Functional Status: Reports: Pain Controlled, Tolerating Diet, Ambulating





- Review of Systems


General: Denies: Fever, Chills


HEENT: Reports: No Symptoms


Pulmonary: Denies: Shortness of Breath


Cardiovascular: Denies: Chest Pain


Gastrointestinal: Denies: Abdominal Pain, Nausea, Vomiting


Genitourinary: Reports: No Symptoms


Musculoskeletal: Reports: No Symptoms


Skin: Reports: No Symptoms


Psychiatric: Reports: Confusion, Agitation, Hallucinations.  Denies: Anxiety, 

Suicidal Ideation, Homicidal Ideation





- Patient Data


Vitals - Most Recent: 


                                Last Vital Signs











Temp  36.2 C   07/17/20 21:48


 


Pulse  71   07/18/20 04:02


 


Resp  25 H  07/18/20 04:02


 


BP  130/93 H  07/18/20 04:02


 


Pulse Ox  97   07/18/20 04:02











Weight - Most Recent: 102.058 kg


I&O - Last 24 Hours: 


                                 Intake & Output











 07/17/20 07/18/20 07/18/20





 22:59 06:59 14:59


 


Intake Total 875 1500 


 


Output Total 785 900 


 


Balance 90 600 











Lab Results Last 24 Hours: 


                         Laboratory Results - last 24 hr











  07/17/20 07/18/20 Range/Units





  04:37 04:10 


 


Sodium  141  144  (136-145)  mEq/L


 


Potassium  4.1  3.6  (3.5-5.1)  mEq/L


 


Chloride  106  106  ()  mEq/L


 


Carbon Dioxide  27  28  (21-32)  mEq/L


 


Anion Gap  12.1  13.6  (5-15)  


 


BUN  11  10  (7-18)  mg/dL


 


Creatinine  1.7 H  1.3  (0.7-1.3)  mg/dL


 


Est Cr Clr Drug Dosing  48.06  62.85  mL/min


 


Estimated GFR (MDRD)  42  58  (>60)  mL/min


 


BUN/Creatinine Ratio  6.5 L  7.7 L  (14-18)  


 


Glucose  97  87  ()  mg/dL


 


Calcium  8.3 L  8.8  (8.5-10.1)  mg/dL


 


Magnesium  1.9  1.7 L  (1.8-2.4)  mg/dl


 


Total Bilirubin  0.6  0.6  (0.2-1.0)  mg/dL


 


AST  29  29  (15-37)  U/L


 


ALT  49  46  (16-63)  U/L


 


Alkaline Phosphatase  72  67  ()  U/L


 


Total Protein  7.0  7.0  (6.4-8.2)  g/dl


 


Albumin  3.1 L  3.0 L  (3.4-5.0)  g/dl


 


Globulin  3.9  4.0  gm/dL


 


Albumin/Globulin Ratio  0.8 L  0.8 L  (1-2)  











Med Orders - Current: 


                               Current Medications





Albuterol/Ipratropium (Duoneb 3.0-0.5 Mg/3 Ml)  3 ml NEB Q4H PRN


   PRN Reason: Shortness Of Breath/wheezing


Bisacodyl (Dulcolax)  5 mg PO DAILY PRN


   PRN Reason: Constipation


Clonidine HCl (Catapres)  0.1 mg PO Q4H PRN


   PRN Reason: Agitation


   Last Admin: 07/17/20 13:24 Dose:  0.1 mg


   Documented by: 


Docusate Sodium (Colace)  100 mg PO BID PRN


   PRN Reason: Constipation


Folic Acid (Folic Acid)  1 mg PO BEDTIME [facility]


   Last Admin: 07/17/20 21:05 Dose:  1 mg


   Documented by: 


Haloperidol Lactate (Haldol)  2 mg IM Q4H PRN


   PRN Reason: Agitation


   Last Admin: 07/17/20 23:55 Dose:  2 mg


   Documented by: 


Heparin Sodium (Porcine) (Heparin Sodium)  5,000 units SUBCUT Q8H [facility]


   Last Admin: 07/18/20 05:52 Dose:  5,000 units


   Documented by: 


Hydrochlorothiazide (Hydrochlorothiazide)  12.5 mg PO DAILY [facility]


   Last Admin: 07/17/20 09:15 Dose:  12.5 mg


   Documented by: 


Promethazine HCl 12.5 mg/ (Sodium Chloride)  50.5 mls @ 100 mls/hr IV Q6H PRN


   PRN Reason: Nausea/Vomiting


   Last Admin: 07/15/20 20:30 Dose:  100 mls/hr


   Documented by: 


Sodium Chloride (Normal Saline)  1,000 mls @ 100 mls/hr IV ASDIRECTED [facility]


   Last Admin: 07/17/20 22:30 Dose:  100 mls/hr


   Documented by: 


Lisinopril (Prinivil)  20 mg PO DAILY [facility]


   Last Admin: 07/17/20 09:15 Dose:  20 mg


   Documented by: 


Lorazepam (Ativan)  0 mg IVPUSH Q1H PRN; Protocol


   PRN Reason: Withdrawal Symptoms


   Last Admin: 07/18/20 05:50 Dose:  2 mg


   Documented by: 


Metoprolol Succinate (Toprol Xl)  50 mg PO DAILY [facility]


   Last Admin: 07/17/20 08:06 Dose:  50 mg


   Documented by: 


Metoprolol Tartrate (Lopressor)  5 mg IVPUSH Q4H PRN


   PRN Reason: Tachycardia


Multivitamins (Thera)  1 each PO BEDTIME [facility]


   Last Admin: 07/17/20 21:23 Dose:  1 each


   Documented by: 


Ondansetron HCl (Zofran)  4 mg IV Q6H PRN


   PRN Reason: Nausea/Vomiting


Pantoprazole Sodium (Protonix***)  40 mg PO DAILY [facility]


   Last Admin: 07/17/20 08:06 Dose:  40 mg


   Documented by: 


Quetiapine Fumarate (Seroquel)  50 mg PO BEDTIME [facility]


   Last Admin: 07/17/20 21:05 Dose:  50 mg


   Documented by: 


Senna/Docusate Sodium (Senna Plus)  1 tab PO BID PRN


   PRN Reason: Constipation


Sodium Chloride (Saline Flush)  10 ml FLUSH ASDIRECTED PRN


   PRN Reason: Keep Vein Open


   Last Admin: 07/15/20 15:31 Dose:  10 ml


   Documented by: 


Thiamine HCl (Vitamin B-1)  100 mg PO BEDTIME [facility]


   Last Admin: 07/17/20 21:04 Dose:  100 mg


   Documented by: 


Topiramate (Topamax)  25 mg PO BID [facility]


   Last Admin: 07/17/20 21:04 Dose:  25 mg


   Documented by: 





Discontinued Medications





Chlordiazepoxide HCl (Librium)  75 mg PO ONETIME ONE


   Stop: 07/15/20 20:54


   Last Admin: 07/15/20 20:57 Dose:  75 mg


   Documented by: 


Chlordiazepoxide HCl (Librium)  50 mg PO Q4H [facility]


   Stop: 07/16/20 18:01


   Last Admin: 07/16/20 02:31 Dose:  Not Given


   Documented by: 


Chlordiazepoxide HCl (Librium)  50 mg PO Q6H [facility]


   Stop: 07/17/20 18:01


Chlordiazepoxide HCl (Librium)  25 mg PO Q4H [facility]


   Stop: 07/18/20 20:01


Chlordiazepoxide HCl (Librium)  25 mg PO Q6H [facility]


   Stop: 07/19/20 18:01


Diphenhydramine HCl (Benadryl)  25 mg IVPUSH ONETIME ONE


   Stop: 07/15/20 19:02


   Last Admin: 07/15/20 20:22 Dose:  25 mg


   Documented by: 


Diphenhydramine HCl (Benadryl)  25 mg IVPUSH ONETIME ONE


   Stop: 07/16/20 21:01


   Last Admin: 07/16/20 20:51 Dose:  25 mg


   Documented by: 


Sodium Chloride (Normal Saline)  1,000 mls @ 999 mls/hr IV ASDIRECTED [facility]


   Last Admin: 07/15/20 15:29 Dose:  999 mls/hr


   Documented by: 


Sodium Chloride (Normal Saline)  1,000 mls @ 999 mls/hr IV ASDIRECTED [facility]


   Last Admin: 07/15/20 17:29 Dose:  999 mls/hr


   Documented by: 


Dextrose/Sodium Chloride (Dextrose 5%-1/2 Ns)  1,000 mls @ 100 mls/hr IV 

ASDIRECTED [facility]


   Last Admin: 07/15/20 23:23 Dose:  100 mls/hr


   Documented by: 


Sodium Chloride (Normal Saline)  1,000 mls @ 50 mls/hr IV ASDIRECTED [facility]


Lorazepam (Ativan)  0 mg IVPUSH Q20M PRN; Protocol


   PRN Reason: Withdrawal Symptoms


   Last Admin: 07/15/20 21:22 Dose:  2 mg


   Documented by: 


Lorazepam (Ativan)  0 mg IVPUSH Q2H PRN; Protocol


   PRN Reason: Withdrawal Symptoms


Lorazepam (Ativan)  0 mg IVPUSH Q20M PRN; Protocol


   PRN Reason: Withdrawal Symptoms


   Last Admin: 07/16/20 08:31 Dose:  2 mg


   Documented by: 


Multivitamins (Thera)  1 each PO BEDTIME [facility]


   Last Admin: 07/17/20 21:39 Dose:  Not Given


   Documented by: 


Ondansetron HCl (Zofran)  4 mg IVPUSH ONETIME ONE


   Stop: 07/15/20 15:18


   Last Admin: 07/15/20 15:29 Dose:  4 mg


   Documented by: 











- Exam


General: Cooperative


HEENT: Pupils Equal, Pupils Reactive, Mucous Membr. Moist/Pink


Neck: Supple


Lungs: Clear to Auscultation, Normal Respiratory Effort, Other (tachypneic)


Cardiovascular: Regular Rate, Regular Rhythm


GI/Abdominal Exam: Normal Bowel Sounds, Soft, Non-Tender, No Organomegaly, No 

Distention, No Mass, Other (Obese)


 (Male) Exam: Deferred


Back Exam: Normal Inspection


Extremities: Normal Inspection, Normal Range of Motion, Non-Tender, No Pedal 

Edema, Normal Capillary Refill


Peripheral Pulses: 2+: Dorsalis Pedis (L), Dorsalis Pedis (R)


Skin: Warm, Dry, Intact


Neurological: Other (slow speech)


Psy/Mental Status: Normal Mood, Withdrawal Symptoms, Other (awake)


Physical Findings Comments:: 


Confused but able to engage. His response is slow.





Sepsis Event Note





- Evaluation


Sepsis Screening Result: No Definite Risk





- Focused Exam


Vital Signs: 


                                   Vital Signs











  Temp Pulse Pulse Resp BP BP Pulse Ox


 


 07/18/20 04:02   71   25 H  130/93 H   97


 


 07/18/20 04:01   69   22 H    97


 


 07/18/20 04:00   73   21 H    96


 


 07/18/20 03:30   77   24 H    97


 


 07/18/20 03:00   67   22 H    90 L


 


 07/18/20 02:30   65   24 H    89 L


 


 07/18/20 02:00   72   22 H    96


 


 07/18/20 01:30     24 H   


 


 07/18/20 01:00     29 H   


 


 07/18/20 00:30   76   24 H    97


 


 07/18/20 00:01   78   29 H    98


 


 07/18/20 00:00     21 H  134/100 H  


 


 07/17/20 23:59     23 H   


 


 07/17/20 23:00     26 H   


 


 07/17/20 22:00   76   26 H    96


 


 07/17/20 21:48  36.2 C      


 


 07/17/20 21:34   84   30 H  138/91 H   95


 


 07/17/20 21:33   81   27 H    96


 


 07/17/20 21:00   81   19    96


 


 07/17/20 20:01   86   21 H    96


 


 07/17/20 20:00   83   21 H  171/124 H   88 L


 


 07/17/20 19:59   81   20    97


 


 07/17/20 19:55   74   19  156/95 H   95


 


 07/17/20 19:54   69   19    92 L


 


 07/17/20 19:53   73   19  154/107 H   96


 


 07/17/20 19:52   67   20    95


 


 07/17/20 19:51    75  20   156/95 H  95











Date Exam was Performed: 07/18/20


Time Exam was Performed: 08:09





- Problem List Review


Problem List Initiated/Reviewed/Updated: Yes





- My Orders


Last 24 Hours: 


My Active Orders





07/17/20 09:00


hydroCHLOROthiazide   12.5 mg PO DAILY 


lisinopriL [Prinivil]   20 mg PO DAILY 





07/17/20 13:57


[facility] [[facility]] Urgent 





07/17/20 21:30


Multivitamins,Therapeutic [Thera]   1 each PO BEDTIME 





07/20/20 07:00


CBC W/O DIFF,HEMOGRAM [HEME] MOTH@0700 07/23/20 07:00


CBC W/O DIFF,HEMOGRAM [HEME] MOTH@0700 07/27/20 07:00


CBC W/O DIFF,HEMOGRAM [HEME] MOTH@0700 07/30/20 07:00


CBC W/O DIFF,HEMOGRAM [HEME] MOTH@0700 08/03/20 07:00


CBC W/O DIFF,HEMOGRAM [HEME] MOTH@0700 





08/06/20 07:00


CBC W/O DIFF,HEMOGRAM [HEME] MOTH@0700 














- Plan


Plan:: 


This is a 53 yo white male with past medical hx/o chronic alcoholism, 

hypertension, hyperlipidemia, GERD, and depression who was brought in by his 

wife due to acute confusion with withdrawal symptoms.





Assessment:


Acute:


* ETOH Abuse/Withdrawal Symptoms. He carries a hx/o chronic alcoholism. He is 

  not very forth coming and it appears he hides his drinking from his family. he

   drinks hard liquor. His wife found 1.25L of Vodka. Per family, his drinking 

  has gotten out of hand since January. CIWA is score is bet 9-15. Will cut down

   CIWAA regimen, goal to utilize clonidine and benzo.





* Acute confusion 2/2 metabolic/toxic encephalopathy. Has abnormal e-lytes and 

  UA/UDS pending. Head CT scan and Brain MRI done in ED are both negative for 

  acute abnormal findings. This could be mainly to alcohol abuse. He remains 

  confused but awake and no able to engage. We will continue monitor.





* Acute on chronic CKD, unknown stage. This is likely 2/2 volume depletion and 

  dehydration from etoh abuse. No previous level for comparison. He comes in 

  with a CR of 2.6 with a BUN of 49. So far he has received 2L of NS in ED. It 

  appears he might have an underlying CKD. Had D5-1/2 NS overnight. We will 

  switch to 1L NS at 100/hr. Renal U/S report read as dilated left renal pelvis:

   uncertain if due to functional UPJ obstruction or hydronephrosis, both 

  resistivity indices are normal within both kidneys. His renal panel seems to 

  be back at his baseline-likely stage 2. Continue to avoid nephrotoxic agents, 

  monitor Is/Os, and daily labs.





* Acute urinary retention. Had > 700 ml of urine residual after abbott cath was 

  placed in. No hx/o enlarged prostate. Will continue with abbott cath until he 

  is fully alert and awake. 





* Obesity Class II. Carries a BMI of 33.9. Dietician consult for weight 

  management.





* Mild Proteinuria. This is likely due to his long standing hypertension. He was

   on hctz/lisinopril for maintenance medications. If no changes on his renal 

  panel, we may be able to resume lisinopril in AM.





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 Findings on renal 

  US. He has no back pain or acute urinary complaints. UA is clean for 

  infection. Sodium, AG, and CO2 improved. No hematuria on catheter bag. VCUG is

   a consideration but would need contrast. Will obtain chart from PCP to check 

  if he has chronic renal insufficiency. Abdominal CT scan report read as 

  cortical cyst within the left kidney causing the questioned dilated renal 

  pelvis on recent U/S. No hydronephrosis seen. No renal calculi or ureteral 

  calculi seen. No additional management needed at this point. This can be 

  followed up outpatient.





* Fatty Liver infiltrate. Abnormal finding noted on Abdominal/Pelvis CT scan. 

  This is likely due to chronic alcoholism. Dietician's consult for proper diet.

   Will  on LSM once fully alert and awake. 





* Mild Hypomagnesemia. This is 2/2 inadequate intake. We will replete and 

  monitor.





Resolved: 


* Hypovolemia Hyperosmolar Hyponatremia. This is likely due to etoh abuse and 

  not able to eat of drink adequately. He is volume depleted. he comes in with a

   NA level of 133. Although this could be affected by his HCTZ and Zoloft he 

  takes for maintenance medications. Resolved. Continue to hold HCTZ.





* Increased AG Metabolic Acidosis. This is 2/2 ETOH Abuse. he is afebrile and no

   signs of systemic infection. Expect to improve with hydration. Resolved.





* Hyperglycemia. He comes to us with BS level of 172. He carries no hx/o glucose

   intolerance or diabetes. Now back to normal range.





* Transaminitis. He comes with AST and ALT levels of  respectively. This is 

  likely due to etoh abuse. Now back to normal range. 





* Mild Leukocytosis. This is likely due to stress. He is afebrile w/o signs of 

  systemic infection. Afebrile and UA is negative. Now back to normal range. 





* Mild Thrombocytopenia. He comes in with an elevated platelet level of 493. 

  This is due to chronic alcohol use. Now back to normal range. 





* Hypocalcemia. He came to us with an initial level of 8.1. Now resolved at 8.8.

   He had a small sandwich yesterday. Expect to improve once he is fully eating 

  and drinking.





 Chronic: 


* HTN


* HLD


* GERD


* Depression


* Chronic Alcoholism





Plan: Continue CIWAA protocol. Thiamine, folic acid and MVI. IV fluid for hydrat

ion. Continue to avoid nephrotoxic agents. Regular diet. DVT/GI Prophylaxis. 

Continue 1:1. Tele-psych consult with Dr. Muñiz when appropriate. SW/CM for 

discharge planning. Spoke to his wife this morning and updated her about his 

clinical progress. 





Critical care time spent: > 30 mins

## 2020-07-19 RX ADMIN — HEPARIN SODIUM SCH UNITS: 5000 INJECTION, SOLUTION INTRAVENOUS; SUBCUTANEOUS at 04:55

## 2020-07-19 RX ADMIN — HEPARIN SODIUM SCH UNITS: 5000 INJECTION, SOLUTION INTRAVENOUS; SUBCUTANEOUS at 21:30

## 2020-07-19 RX ADMIN — LORAZEPAM PRN MG: 2 INJECTION INTRAMUSCULAR; INTRAVENOUS at 03:10

## 2020-07-19 RX ADMIN — THERA TABS SCH EACH: TAB at 21:34

## 2020-07-19 RX ADMIN — HEPARIN SODIUM SCH UNITS: 5000 INJECTION, SOLUTION INTRAVENOUS; SUBCUTANEOUS at 12:31

## 2020-07-19 RX ADMIN — LORAZEPAM PRN MG: 2 INJECTION INTRAMUSCULAR; INTRAVENOUS at 22:50

## 2020-07-19 NOTE — PCM.PN
- General Info


Date of Service: 07/19/20


Admission Dx/Problem (Free Text): 


ETOH Withdrawal


Subjective Update: 





7/19/20: He had a rough night. Per night nurse, he was extreme;y confused and 

hallucinating. He ended up receiving a total of 3-4 mg of Ativan, Clonodine 0.1 

mg x 3 and a one time dose of 5 mg Haldol. His CIWAA has been 8-9.   





7/18/20: Patient is still in withdrawal. He is confused and having visual 

hallucinations. He has received haldol and ativan round the clock overnight. He 

ate a small piece of sandwich at about 5 PM but nothing last night. He is 

however awake but not fully alert and able to engage (i.e. talk ) this morning. 

Updated his wife about his clinical progress. His Mg is down to1.7 and his renal

panel improved. His CIWAA is bet 9-15.





7/17/20: Patient detoxed hard last night. He was getting ativan round the clock 

starting at 11 pm. He is comfortably resting this morning. His CIWAA is bet 7-

10. His UA and UDS were negative. 


Functional Status: Reports: Pain Controlled, Tolerating Diet, Ambulating, 

Urinating





- Review of Systems


General: Denies: Fever, Chills


HEENT: Reports: No Symptoms


Pulmonary: Denies: Shortness of Breath


Cardiovascular: Denies: Chest Pain


Gastrointestinal: Denies: Abdominal Pain, Nausea, Vomiting


Genitourinary: Reports: No Symptoms


Musculoskeletal: Reports: No Symptoms


Skin: Reports: No Symptoms


Neurological: Reports: Confusion, Tremors, Gait Disturbance.  Denies: Dizziness,

Headache, Seizure, Syncope, Trouble Speaking, Weakness


Psychiatric: Reports: Hallucinations.  Denies: Agitation, Suicidal Ideation, 

Homicidal Ideation


Systems Review Comment:: 


ROS obtained from nurse. He is currently sedated but arousable with spontaneous 

movements.





- Patient Data


Vitals - Most Recent: 


                                Last Vital Signs











Temp  36.9 C   07/19/20 03:35


 


Pulse  85   07/19/20 03:35


 


Resp  14   07/19/20 03:35


 


BP  136/91 H  07/19/20 05:59


 


Pulse Ox  95   07/19/20 03:35











Weight - Most Recent: 101.877 kg


I&O - Last 24 Hours: 


                                 Intake & Output











 07/18/20 07/18/20 07/19/20





 14:59 22:59 06:59


 


Intake Total 120 1300 1939


 


Output Total 1250 400 975


 


Balance -1130 900 964











Lab Results Last 24 Hours: 


                         Laboratory Results - last 24 hr











  07/18/20 Range/Units





  04:10 


 


Sodium  144  (136-145)  mEq/L


 


Potassium  3.6  (3.5-5.1)  mEq/L


 


Chloride  106  ()  mEq/L


 


Carbon Dioxide  28  (21-32)  mEq/L


 


Anion Gap  13.6  (5-15)  


 


BUN  10  (7-18)  mg/dL


 


Creatinine  1.3  (0.7-1.3)  mg/dL


 


Est Cr Clr Drug Dosing  62.85  mL/min


 


Estimated GFR (MDRD)  58  (>60)  mL/min


 


BUN/Creatinine Ratio  7.7 L  (14-18)  


 


Glucose  87  ()  mg/dL


 


Calcium  8.8  (8.5-10.1)  mg/dL


 


Magnesium  1.7 L  (1.8-2.4)  mg/dl


 


Total Bilirubin  0.6  (0.2-1.0)  mg/dL


 


AST  29  (15-37)  U/L


 


ALT  46  (16-63)  U/L


 


Alkaline Phosphatase  67  ()  U/L


 


Total Protein  7.0  (6.4-8.2)  g/dl


 


Albumin  3.0 L  (3.4-5.0)  g/dl


 


Globulin  4.0  gm/dL


 


Albumin/Globulin Ratio  0.8 L  (1-2)  











Med Orders - Current: 


                               Current Medications





Albuterol/Ipratropium (Duoneb 3.0-0.5 Mg/3 Ml)  3 ml NEB Q4H PRN


   PRN Reason: Shortness Of Breath/wheezing


Bisacodyl (Dulcolax)  5 mg PO DAILY PRN


   PRN Reason: Constipation


Clonidine HCl (Catapres)  0.1 mg PO Q4H PRN


   PRN Reason: Agitation


   Last Admin: 07/19/20 05:59 Dose:  0.1 mg


   Documented by: 


Docusate Sodium (Colace)  100 mg PO BID PRN


   PRN Reason: Constipation


Folic Acid (Folic Acid)  1 mg PO BEDTIME [facility]


   Last Admin: 07/18/20 21:36 Dose:  1 mg


   Documented by: 


Heparin Sodium (Porcine) (Heparin Sodium)  5,000 units SUBCUT Q8H [facility]


   Last Admin: 07/19/20 04:55 Dose:  5,000 units


   Documented by: 


Hydrochlorothiazide (Hydrochlorothiazide)  12.5 mg PO DAILY [facility]


   Last Admin: 07/18/20 07:59 Dose:  12.5 mg


   Documented by: 


Promethazine HCl 12.5 mg/ (Sodium Chloride)  50.5 mls @ 100 mls/hr IV Q6H PRN


   PRN Reason: Nausea/Vomiting


   Last Admin: 07/15/20 20:30 Dose:  100 mls/hr


   Documented by: 


Sodium Chloride (Normal Saline)  1,000 mls @ 100 mls/hr IV ASDIRECTED [facility]


   Last Admin: 07/19/20 03:30 Dose:  100 mls/hr


   Documented by: 


Lisinopril (Prinivil)  20 mg PO DAILY [facility]


   Last Admin: 07/18/20 08:00 Dose:  20 mg


   Documented by: 


Lorazepam (Ativan)  0 mg IVPUSH Q1H PRN; Protocol


   PRN Reason: Withdrawal Symptoms


   Last Admin: 07/19/20 03:10 Dose:  1 mg


   Documented by: 


Metoprolol Succinate (Toprol Xl)  50 mg PO DAILY [facility]


   Last Admin: 07/18/20 07:59 Dose:  50 mg


   Documented by: 


Metoprolol Tartrate (Lopressor)  5 mg IVPUSH Q4H PRN


   PRN Reason: Tachycardia


Multivitamins (Thera)  1 each PO BEDTIME [facility]


   Last Admin: 07/18/20 21:36 Dose:  1 each


   Documented by: 


Ondansetron HCl (Zofran)  4 mg IV Q6H PRN


   PRN Reason: Nausea/Vomiting


Pantoprazole Sodium (Protonix***)  40 mg PO DAILY [facility]


   Last Admin: 07/18/20 08:00 Dose:  40 mg


   Documented by: 


Quetiapine Fumarate (Seroquel)  50 mg PO BEDTIME [facility]


   Last Admin: 07/18/20 23:41 Dose:  Not Given


   Documented by: 


Senna/Docusate Sodium (Senna Plus)  1 tab PO BID PRN


   PRN Reason: Constipation


Sodium Chloride (Saline Flush)  10 ml FLUSH ASDIRECTED PRN


   PRN Reason: Keep Vein Open


   Last Admin: 07/15/20 15:31 Dose:  10 ml


   Documented by: 


Thiamine HCl (Vitamin B-1)  100 mg PO BEDTIME [facility]


   Last Admin: 07/18/20 21:36 Dose:  100 mg


   Documented by: 


Topiramate (Topamax)  25 mg PO BID [facility]


   Last Admin: 07/18/20 23:41 Dose:  Not Given


   Documented by: 





Discontinued Medications





Chlordiazepoxide HCl (Librium)  75 mg PO ONETIME ONE


   Stop: 07/15/20 20:54


   Last Admin: 07/15/20 20:57 Dose:  75 mg


   Documented by: 


Chlordiazepoxide HCl (Librium)  50 mg PO Q4H [facility]


   Stop: 07/16/20 18:01


   Last Admin: 07/16/20 02:31 Dose:  Not Given


   Documented by: 


Chlordiazepoxide HCl (Librium)  50 mg PO Q6H [facility]


   Stop: 07/17/20 18:01


Chlordiazepoxide HCl (Librium)  25 mg PO Q4H [facility]


   Stop: 07/18/20 20:01


Chlordiazepoxide HCl (Librium)  25 mg PO Q6H [facility]


   Stop: 07/19/20 18:01


Diphenhydramine HCl (Benadryl)  25 mg IVPUSH ONETIME ONE


   Stop: 07/15/20 19:02


   Last Admin: 07/15/20 20:22 Dose:  25 mg


   Documented by: 


Diphenhydramine HCl (Benadryl)  25 mg IVPUSH ONETIME ONE


   Stop: 07/16/20 21:01


   Last Admin: 07/16/20 20:51 Dose:  25 mg


   Documented by: 


Haloperidol Lactate (Haldol)  2 mg IM Q4H PRN


   PRN Reason: Agitation


   Last Admin: 07/17/20 23:55 Dose:  2 mg


   Documented by: 


Haloperidol Lactate (Haldol)  5 mg IVPUSH ONETIME ONE


   Stop: 07/19/20 04:39


   Last Admin: 07/19/20 04:45 Dose:  5 mg


   Documented by: 


Sodium Chloride (Normal Saline)  1,000 mls @ 999 mls/hr IV ASDIRECTED [facility]


   Last Admin: 07/15/20 15:29 Dose:  999 mls/hr


   Documented by: 


Sodium Chloride (Normal Saline)  1,000 mls @ 999 mls/hr IV ASDIRECTED [facility]


   Last Admin: 07/15/20 17:29 Dose:  999 mls/hr


   Documented by: 


Dextrose/Sodium Chloride (Dextrose 5%-1/2 Ns)  1,000 mls @ 100 mls/hr IV 

ASDIRECTED [facility]


   Last Admin: 07/15/20 23:23 Dose:  100 mls/hr


   Documented by: 


Sodium Chloride (Normal Saline)  1,000 mls @ 50 mls/hr IV ASDIRECTED [facility]


Magnesium Sulfate/Dextrose 1 (gm/ Premix)  100 mls @ 100 mls/hr IV ONETIME ONE


   Stop: 07/18/20 08:13


   Last Admin: 07/18/20 07:30 Dose:  100 mls/hr


   Documented by: 


Lorazepam (Ativan)  0 mg IVPUSH Q20M PRN; Protocol


   PRN Reason: Withdrawal Symptoms


   Last Admin: 07/15/20 21:22 Dose:  2 mg


   Documented by: 


Lorazepam (Ativan)  0 mg IVPUSH Q2H PRN; Protocol


   PRN Reason: Withdrawal Symptoms


Lorazepam (Ativan)  0 mg IVPUSH Q20M PRN; Protocol


   PRN Reason: Withdrawal Symptoms


   Last Admin: 07/16/20 08:31 Dose:  2 mg


   Documented by: 


Multivitamins (Thera)  1 each PO BEDTIME [facility]


   Last Admin: 07/17/20 21:39 Dose:  Not Given


   Documented by: 


Ondansetron HCl (Zofran)  4 mg IVPUSH ONETIME ONE


   Stop: 07/15/20 15:18


   Last Admin: 07/15/20 15:29 Dose:  4 mg


   Documented by: 











- Exam


General: No Acute Distress, Other (Awake)


HEENT: Pupils Equal, Pupils Reactive, Mucous Membr. Moist/Pink


Neck: Supple


Lungs: Clear to Auscultation, Normal Respiratory Effort, Decreased Breath Sounds


Cardiovascular: Regular Rate, Regular Rhythm


GI/Abdominal Exam: Normal Bowel Sounds, Soft, Non-Tender, No Organomegaly, No 

Distention, No Mass, Tender


 (Male) Exam: Deferred


Back Exam: Normal Inspection, Decreased Range of Motion


Extremities: Normal Inspection, Non-Tender, No Pedal Edema, Normal Capillary 

Refill


Peripheral Pulses: 2+: Dorsalis Pedis (L), Dorsalis Pedis (R)


Skin: Warm, Dry, Intact


Neurological: Other (not appropraite due to sedation but arousable)


Psy/Mental Status: Hallucinations, Withdrawal Symptoms.  No: Suicidal Ideation, 

Homicidal Ideation





Sepsis Event Note





- Evaluation


Sepsis Screening Result: No Definite Risk





- Focused Exam


Vital Signs: 


                                   Vital Signs











  Temp Pulse Pulse Resp BP BP Pulse Ox


 


 07/19/20 05:59      136/91 H  


 


 07/19/20 03:35  36.9 C   85  14   136/91 H  95


 


 07/19/20 03:00   90      95


 


 07/19/20 02:32      132/84  


 


 07/19/20 02:30   82      96


 


 07/19/20 02:00   85      96


 


 07/19/20 01:30   91      96


 


 07/19/20 01:00   81      94 L


 


 07/19/20 00:31   83      97


 


 07/19/20 00:00   84      100


 


 07/18/20 23:37  36.6 C  86  86  12  134/87  134/87  97


 


 07/18/20 23:36   86      98


 


 07/18/20 23:35   86    135/112 H   98


 


 07/18/20 23:34   84      97


 


 07/18/20 23:30   81      100


 


 07/18/20 23:00   84      96


 


 07/18/20 22:49   87      94 L


 


 07/18/20 21:08   89      95


 


 07/18/20 20:51   92    165/106 H   96


 


 07/18/20 20:50   90      95


 


 07/18/20 20:11      160/133 H  


 


 07/18/20 20:01      160/133 H  


 


 07/18/20 20:00  37.1 C   88  18   165/106 H  98











Date Exam was Performed: 07/19/20


Time Exam was Performed: 19:23





- Problem List Review


Problem List Initiated/Reviewed/Updated: Yes





- My Orders


Last 24 Hours: 


My Active Orders





07/18/20 17:52


OT Evaluation and Treatment [CONS] Routine 


PT Evaluation and Treatment [CONS] Routine 





07/18/20 17:54


Consult to Speech Language Pathology [SLP Evaluation and Treatment] [CONS] 

Routine 





07/20/20 05:11


BMP [BASIC METABOLIC PANEL,BMP] [CHEM] AM 


MG [MAGNESIUM] [CHEM] AM 





07/20/20 07:00


CBC W/O DIFF,HEMOGRAM [HEME] MOTH@0700 07/23/20 07:00


CBC W/O DIFF,HEMOGRAM [HEME] MOTH@0700 07/27/20 07:00


CBC W/O DIFF,HEMOGRAM [HEME] MOTH@0700 07/30/20 07:00


CBC W/O DIFF,HEMOGRAM [HEME] MOTH@0700 08/03/20 07:00


CBC W/O DIFF,HEMOGRAM [HEME] MOTH@0700 08/06/20 07:00


CBC W/O DIFF,HEMOGRAM [HEME] MOTH@0700 














- Plan


Plan:: 


This is a 55 yo white male with past medical hx/o chronic alcoholism, 

hypertension, hyperlipidemia, GERD, and depression who was brought in by his 

wife due to acute confusion with withdrawal symptoms.





Assessment:


Acute:


* Delirium Tremens/Withdrawal Symptoms. He carries a hx/o chronic alcoholism. He

   is not very forth coming and it appears he hides his drinking from his 

  family. he drinks hard liquor. His wife found 1.25L of Vodka. Per family, his 

  drinking has gotten out of hand since January. HAd a rough night last night. 

  He was apparently aggressive. CIWA is score is bet 8-9. Goal to utilize 

  clonidine and benzo. Will resume Seroquel and Topamax tonight for maintenance.





* Acute confusion 2/2 metabolic/toxic encephalopathy. Has abnormal e-lytes and 

  UA/UDS pending. Head CT scan and Brain MRI done in ED are both negative for 

  acute abnormal findings. This could be mainly to alcohol abuse. He was 

  extremely confused last night. We will continue monitor.





* Acute urinary retention. Had > 700 ml of urine residual after abbott cath was 

  placed in. No hx/o enlarged prostate. Will continue with abbott cath until he 

  is fully alert and awake. Not ready to remove.





* Obesity Class II. Carries a BMI of 33.9. Dietician consult for weight 

  management.





* Mild Proteinuria. This is likely due to his long standing hypertension. He was

   on hctz/lisinopril for maintenance medications. If no changes on his renal 

  panel, we may be able to resume lisinopril in AM.





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 Findings on renal 

  US. He has no back pain or acute urinary complaints. UA is clean for 

  infection. Sodium, AG, and CO2 improved. No hematuria on catheter bag. VCUG is

   a consideration but would need contrast. Will obtain chart from PCP to check 

  if he has chronic renal insufficiency. Abdominal CT scan report read as 

  cortical cyst within the left kidney causing the questioned dilated renal 

  pelvis on recent U/S. No hydronephrosis seen. No renal calculi or ureteral 

  calculi seen. No additional management needed at this point. This can be 

  followed up outpatient.





* Fatty Liver infiltrate. Abnormal finding noted on Abdominal/Pelvis CT scan. 

  This is likely due to chronic alcoholism. Dietician's consult for proper diet.

   Will  on LSM once fully alert and awake. 





* Mild Hypomagnesemia. This is 2/2 inadequate intake. We will continue to 

  replete and monitor.





Resolved: 


* Hypovolemia Hyperosmolar Hyponatremia. This is likely due to etoh abuse and 

  not able to eat of drink adequately. He is volume depleted. he comes in with a

   NA level of 133. Although this could be affected by his HCTZ and Zoloft he 

  takes for maintenance medications. Resolved. Continue to hold HCTZ.





* Increased AG Metabolic Acidosis. This is 2/2 ETOH Abuse. he is afebrile and no

   signs of systemic infection. Expect to improve with hydration. Resolved.





* Hyperglycemia. He comes to us with BS level of 172. He carries no hx/o glucose

   intolerance or diabetes. Now back to normal range.





* Transaminitis. He comes with AST and ALT levels of  respectively. This is l

  ikely due to etoh abuse. Now back to normal range. 





* Mild Leukocytosis. This is likely due to stress. He is afebrile w/o signs of 

  systemic infection. Afebrile and UA is negative. Now back to normal range. 





* Mild Thrombocytopenia. He comes in with an elevated platelet level of 493. 

  This is due to chronic alcohol use. Now back to normal range. 





* Hypocalcemia. He came to us with an initial level of 8.1. Now resolved at 8.8.

   He had a small sandwich yesterday. Expect to improve once he is fully eating 

  and drinking.





* Acute on chronic CKD, unknown stage. This is likely 2/2 volume depletion and 

  dehydration from etoh abuse. No previous level for comparison. He comes in 

  with a CR of 2.6 with a BUN of 49. So far he has received 2L of NS in ED. It 

  appears he might have an underlying CKD. Had D5-1/2 NS overnight. We will 

  switch to 1L NS at 100/hr. Renal U/S report read as dilated left renal pelvis:

   uncertain if due to functional UPJ obstruction or hydronephrosis, both 

  resistivity indices are normal within both kidneys. His renal panel seems to 

  be back at his baseline-likely stage 2. 





 Chronic: 


* HTN


* HLD


* GERD


* Depression


* Chronic Alcoholism





Plan: Continue CIWAA protocol. Thiamine, folic acid and MVI. IV fluid for 

hydration. Regular diet. DVT/GI Prophylaxis. Resume 1:1. Tele-psych consult with

 Dr. Muñiz when appropriate. SW/CM for discharge planning. Spoke to his wife 

this morning and updated her about his clinical progress. 





Critical care time spent: > 20 mins

## 2020-07-20 RX ADMIN — THERA TABS SCH EACH: TAB at 19:34

## 2020-07-20 RX ADMIN — LORAZEPAM PRN MG: 2 INJECTION INTRAMUSCULAR; INTRAVENOUS at 21:31

## 2020-07-20 RX ADMIN — HEPARIN SODIUM SCH UNITS: 5000 INJECTION, SOLUTION INTRAVENOUS; SUBCUTANEOUS at 06:10

## 2020-07-20 RX ADMIN — HEPARIN SODIUM SCH UNITS: 5000 INJECTION, SOLUTION INTRAVENOUS; SUBCUTANEOUS at 12:36

## 2020-07-20 RX ADMIN — THERA TABS SCH: TAB at 22:13

## 2020-07-20 NOTE — CONS
CONSULTING PHYSICIAN:  Ayo Muñiz MD

DATE OF CONSULTATION:  07/20/2020

 

Site where the services are provided are [facility]

in [place], [place].

 

Site where the services are provided from our offices in [facility].

 

Length of service for this 60-minute inpatient telemedicine event is 60 minutes.

 

IDENTIFICATION:

The patient is a 54-year-old male who was admitted to the inpatient MICU at

[facility] in [place], [place].  He is seen

for psychiatric consultation per the request of staff attending, Dr. Cabrera, and

his treatment team.

 

CHIEF COMPLAINT:

"Relapse."

 

HISTORY OF PRESENT ILLNESS:

The patient is a 54-year-old male who was admitted to the inpatient MICU at

[facility] in [place], [place], on

07/15/2020 for signs and symptoms of alcohol withdrawal.  The patient is seen

with his mother present and she also participates in the interview and states

"he has been drinking heavily since Christmas."  The patient states that he

relapsed after about 3 years of sobriety and he states he has been drinking "a

lot," but cannot be more specific than that, though he does note that he has

been drinking "hard liquor."  The patient's mother also states that "I think he

has been quite depressed," and the patient agrees with this assessment.  He

states that he has a lot of anxiety, but he generally sleeps pretty good and he

has a lot of worries about his work because he is an  and he states

that he needs to get better so he can get back to work because he has "about

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  I don't want anyone else to take it over.  I

feel responsible" for those accounts.  The patient had been taking Zoloft

apparently, but when asked about this, he states that he does not think he has

been taking it to regular.  The patient's mother states that the patient has

been on Prozac in the past and neither of them are sure if it really helped or

not because "it was a long time ago."  The patient denies any suicide or

homicide.  He denies any psychotic, delusional, or paranoid symptoms.  He denies

any other illicit substance use complicating his clinical picture at this point

in time.

 

MEDICATIONS:

At admission, Zoloft 50 mg daily was listed, but the patient denies he has been

taking this medication recently.

 

ALLERGIES:

No known drug allergies.

 

PAST MEDICAL HISTORY:

Signs and symptoms of alcohol withdrawal.

 

REVIEW OF SYSTEMS:

Aside from neuro, all other major organ systems are negative at this point in

time for acute difficulties or complications.

 

FAMILY PSYCHIATRIC AND CD HISTORY:

The patient reports father had a history of alcohol.

 

PAST PSYCHIATRIC AND CD HISTORY:

The patient denies any previous psychiatric hospitalizations, but does report

being in chemical dependency treatments in the past, but does not report how

many, stating "a few."  He denies any exposure to AA in the past.

 

PAST PSYCHIATRIC MEDICATION HISTORY:

Includes Prozac.

 

Primary MD is Dr. Woo on an outpatient basis.

 

SOCIAL HISTORY:

The patient is born and raised in [place], [place].  He is an 

by profession.  His wife is a pharmacist.  He has 4 children and 1 grandson.  He

lives in [place], and he is [employer] in terms of his alfredo formation.

 

MENTAL STATUS EXAM:

The patient is a 54-year-old white male in no apparent distress.  Speech is of

regular rate and rhythm.  The patient is cognitively oriented to person and

place, but not to date.  There are no abnormal motor movements or tics observed.

Gait and station are not observed.  This patient is lying in bed during the

inpatient consult.  Mood is depressed.  Affect is consistent with stated mood

and restricted and does become tearful at times throughout the course of the

interview.  There is no behavioral or stated evidence of acute suicidal or

homicidal ideation or acute psychotic, delusional, or paranoid symptoms.

Thought processes are significant for some thought blocking as well as reported

racing thoughts and ruminations.  There are no manic symptoms or loose

associations evident.  Judgment and insight still do appear impaired at this

point secondary to the severity of the patient's alcohol use and alcohol

withdrawal that he is experiencing at this point in time.  Motivation for help

is fair.

 

VITALS:  At time of presentation, 153/98, 92, 18, 98.4 degrees.

 

IMPRESSION:

Axis I:

1. Alcohol dependence, F10.20.

2. Major depressive disorder, severe, F32.2.

3. Anxiety disorder, not otherwise specified, F41.9.

4. Rule out generalized anxiety disorder.

5. Rule out bipolar affective disease, mixed type.

Axis II:  None.

Axis III:  Signs and symptoms of alcohol withdrawal.

Axis IV:  Severe.

Axis V:  55.

 

PLAN:

1. Sobriety.

2. Discontinue Zoloft.

3. Folic acid supplementation while on unit.

4. Thiamine supplementation while on unit.

5. Ativan per CIWA protocol while on unit.

6. Continue Topamax 25 mg b.i.d. as currently prescribed for mood stability,

    anxiety reduction, and seizure prophylaxis.

7. Continue Seroquel 50 mg at bedtime for clarity of thought, mood stability,

    sleep initiation and maintenance, anxiety reduction, and elimination of any

    psychotic or paranoid symptoms.

8. Begin trial of Prozac 20 mg daily to help with symptoms of depression.

9. Recommend that AA visit the patient while on unit.

10.Pastoral guidance.

11.Recommend that when the patient is medically stabilized that he be

    transferred to inpatient CD treatment to help with his alcohol dependence

    in terms of helping him attain sustained sobriety.

12.Recommend that the patient follow up with Outpatient Psychiatry when he

    completes CD treatment to assess his overall function and efficacy of his

    newly initiated psychiatric medication regimen.

13.We will continue follow up with the patient on an as-needed basis while he

    remains on the inpatient unit at [facility] in

    [place], [place].

14.We will follow up with the patient sooner if there are any complications in

    the interim.

15.Crisis plan is in place.

 

DD:  07/20/2020 21:41:53

DT:  07/20/2020 23:17:50  HI

Job #:  [id number]

## 2020-07-20 NOTE — PCM.PN
- General Info


Date of Service: 07/20/20


Admission Dx/Problem (Free Text): 


ETOH Withdrawal


Subjective Update: 





7/20/20: He slept well and had an uneventful night. However he had a temp as 

high as 100.8T early morning hours. He had not signs of ongoing infection. No 

chills, no cough, or signs of upper respiratory infection. No dysuria.





7/19/20: He had a rough night. Per night nurse, he was extreme;y confused and 

hallucinating. He ended up receiving a total of 3-4 mg of Ativan, Clonodine 0.1 

mg x 3 and a one time dose of 5 mg Haldol. His CIWAA has been 8-9.   





7/18/20: Patient is still in withdrawal. He is confused and having visual 

hallucinations. He has received haldol and ativan round the clock overnight. He 

ate a small piece of sandwich at about 5 PM but nothing last night. He is 

however awake but not fully alert and able to engage (i.e. talk ) this morning. 

Updated his wife about his clinical progress. His Mg is down to1.7 and his renal

panel improved. His CIWAA is bet 9-15.





7/17/20: Patient detoxed hard last night. He was getting ativan round the clock 

starting at 11 pm. He is comfortably resting this morning. His CIWAA is bet 7-

10. His UA and UDS were negative. 


Functional Status: Reports: Pain Controlled, Tolerating Diet, Ambulating, 

Urinating





- Review of Systems


General: Reports: Fever.  Denies: Chills


HEENT: Reports: No Symptoms


Pulmonary: Denies: Shortness of Breath


Cardiovascular: Denies: Chest Pain, Dyspnea on Exertion, Lightheadedness


Gastrointestinal: Denies: Abdominal Pain, Nausea, Vomiting


Genitourinary: Reports: No Symptoms


Musculoskeletal: Reports: No Symptoms


Neurological: Denies: Seizure, Tremors, Trouble Speaking, Difficulty Walking


Psychiatric: Reports: No Symptoms, Confusion.  Denies: Depression, Anxiety, 

Agitation, Hallucinations, Suicidal Ideation, Homicidal Ideation





- Patient Data


Vitals - Most Recent: 


                                Last Vital Signs











Temp  37.1 C   07/20/20 04:34


 


Pulse  88   07/20/20 04:34


 


Resp  24 H  07/20/20 04:34


 


BP  128/79   07/20/20 04:34


 


Pulse Ox  99   07/20/20 04:34











Weight - Most Recent: 101.605 kg


I&O - Last 24 Hours: 


                                 Intake & Output











 07/19/20 07/19/20 07/20/20





 14:59 22:59 06:59


 


Intake Total  1500 800


 


Output Total 750 1175 700


 


Balance -750 325 100











Lab Results Last 24 Hours: 


                         Laboratory Results - last 24 hr











  07/20/20 07/20/20 Range/Units





  04:55 04:55 


 


WBC  9.66 H   (4.23-9.07)  K/mm3


 


RBC  5.12   (4.63-6.08)  M/mm3


 


Hgb  14.2   (13.7-17.5)  gm/dl


 


Hct  43.2   (40.1-51.0)  %


 


MCV  84.4   (79.0-92.2)  fl


 


MCH  27.7   (25.7-32.2)  pg


 


MCHC  32.9   (32.2-35.5)  g/dl


 


RDW Std Deviation  42.2   (35.1-43.9)  fL


 


Plt Count  263   (163-337)  K/mm3


 


MPV  8.4 L   (9.4-12.3)  fl


 


Sodium   141  (136-145)  mEq/L


 


Potassium   4.0  (3.5-5.1)  mEq/L


 


Chloride   103  ()  mEq/L


 


Carbon Dioxide   27  (21-32)  mEq/L


 


Anion Gap   15.0  (5-15)  


 


BUN   12  (7-18)  mg/dL


 


Creatinine   0.9  (0.7-1.3)  mg/dL


 


Est Cr Clr Drug Dosing   90.78  mL/min


 


Estimated GFR (MDRD)   > 60  (>60)  mL/min


 


BUN/Creatinine Ratio   13.3 L  (14-18)  


 


Glucose   85  ()  mg/dL


 


Calcium   9.1  (8.5-10.1)  mg/dL


 


Magnesium   2.1  (1.8-2.4)  mg/dl











Med Orders - Current: 


                               Current Medications





Acetaminophen (Tylenol)  650 mg PO Q6HR PRN


   PRN Reason: Pain/Fever


   Last Admin: 07/19/20 21:44 Dose:  650 mg


   Documented by: 


Albuterol/Ipratropium (Duoneb 3.0-0.5 Mg/3 Ml)  3 ml NEB Q4H PRN


   PRN Reason: Shortness Of Breath/wheezing


Bisacodyl (Dulcolax)  5 mg PO DAILY PRN


   PRN Reason: Constipation


Clonidine HCl (Catapres)  0.1 mg PO Q4H PRN


   PRN Reason: Agitation


   Last Admin: 07/19/20 05:59 Dose:  0.1 mg


   Documented by: 


Docusate Sodium (Colace)  100 mg PO BID PRN


   PRN Reason: Constipation


   Last Admin: 07/19/20 21:34 Dose:  100 mg


   Documented by: 


Folic Acid (Folic Acid)  1 mg PO BEDTIME [facility]


   Last Admin: 07/19/20 21:34 Dose:  1 mg


   Documented by: 


Heparin Sodium (Porcine) (Heparin Sodium)  5,000 units SUBCUT Q8H [facility]


   Last Admin: 07/20/20 06:10 Dose:  5,000 units


   Documented by: 


Hydrochlorothiazide (Hydrochlorothiazide)  12.5 mg PO DAILY [facility]


   Last Admin: 07/19/20 08:17 Dose:  12.5 mg


   Documented by: 


Promethazine HCl 12.5 mg/ (Sodium Chloride)  50.5 mls @ 100 mls/hr IV Q6H PRN


   PRN Reason: Nausea/Vomiting


   Last Admin: 07/15/20 20:30 Dose:  100 mls/hr


   Documented by: 


Lisinopril (Prinivil)  20 mg PO DAILY [facility]


   Last Admin: 07/19/20 08:18 Dose:  20 mg


   Documented by: 


Lorazepam (Ativan)  0 mg IVPUSH Q1H PRN; Protocol


   PRN Reason: Withdrawal Symptoms


   Last Admin: 07/19/20 22:50 Dose:  1 mg


   Documented by: 


Metoprolol Succinate (Toprol Xl)  50 mg PO DAILY [facility]


   Last Admin: 07/19/20 08:18 Dose:  50 mg


   Documented by: 


Metoprolol Tartrate (Lopressor)  5 mg IVPUSH Q4H PRN


   PRN Reason: Tachycardia


Multivitamins (Thera)  1 each PO BEDTIME [facility]


   Last Admin: 07/19/20 21:34 Dose:  1 each


   Documented by: 


Ondansetron HCl (Zofran)  4 mg IV Q6H PRN


   PRN Reason: Nausea/Vomiting


Pantoprazole Sodium (Protonix***)  40 mg PO DAILY [facility]


   Last Admin: 07/19/20 08:18 Dose:  40 mg


   Documented by: 


Quetiapine Fumarate (Seroquel)  50 mg PO BEDTIME [facility]


   Last Admin: 07/19/20 21:34 Dose:  50 mg


   Documented by: 


Senna/Docusate Sodium (Senna Plus)  1 tab PO BID PRN


   PRN Reason: Constipation


   Last Admin: 07/19/20 21:46 Dose:  1 tab


   Documented by: 


Sodium Chloride (Saline Flush)  10 ml FLUSH ASDIRECTED PRN


   PRN Reason: Keep Vein Open


   Last Admin: 07/15/20 15:31 Dose:  10 ml


   Documented by: 


Sodium Chloride (Saline Flush)  10 ml FLUSH ASDIRECTED PRN


   PRN Reason: Keep Vein Open


Thiamine HCl (Vitamin B-1)  100 mg PO BEDTIME [facility]


   Last Admin: 07/19/20 21:34 Dose:  100 mg


   Documented by: 


Topiramate (Topamax)  25 mg PO BID [facility]


   Last Admin: 07/19/20 21:34 Dose:  25 mg


   Documented by: 





Discontinued Medications





Chlordiazepoxide HCl (Librium)  75 mg PO ONETIME ONE


   Stop: 07/15/20 20:54


   Last Admin: 07/15/20 20:57 Dose:  75 mg


   Documented by: 


Chlordiazepoxide HCl (Librium)  50 mg PO Q4H [facility]


   Stop: 07/16/20 18:01


   Last Admin: 07/16/20 02:31 Dose:  Not Given


   Documented by: 


Chlordiazepoxide HCl (Librium)  50 mg PO Q6H [facility]


   Stop: 07/17/20 18:01


Chlordiazepoxide HCl (Librium)  25 mg PO Q4H [facility]


   Stop: 07/18/20 20:01


Chlordiazepoxide HCl (Librium)  25 mg PO Q6H [facility]


   Stop: 07/19/20 18:01


Diphenhydramine HCl (Benadryl)  25 mg IVPUSH ONETIME ONE


   Stop: 07/15/20 19:02


   Last Admin: 07/15/20 20:22 Dose:  25 mg


   Documented by: 


Diphenhydramine HCl (Benadryl)  25 mg IVPUSH ONETIME ONE


   Stop: 07/16/20 21:01


   Last Admin: 07/16/20 20:51 Dose:  25 mg


   Documented by: 


Haloperidol Lactate (Haldol)  2 mg IM Q4H PRN


   PRN Reason: Agitation


   Last Admin: 07/17/20 23:55 Dose:  2 mg


   Documented by: 


Haloperidol Lactate (Haldol)  5 mg IVPUSH ONETIME ONE


   Stop: 07/19/20 04:39


   Last Admin: 07/19/20 04:45 Dose:  5 mg


   Documented by: 


Sodium Chloride (Normal Saline)  1,000 mls @ 999 mls/hr IV ASDIRECTED [facility]


   Last Admin: 07/15/20 15:29 Dose:  999 mls/hr


   Documented by: 


Sodium Chloride (Normal Saline)  1,000 mls @ 999 mls/hr IV ASDIRECTED [facility]


   Last Admin: 07/15/20 17:29 Dose:  999 mls/hr


   Documented by: 


Dextrose/Sodium Chloride (Dextrose 5%-1/2 Ns)  1,000 mls @ 100 mls/hr IV 

ASDIRECTED [facility]


   Last Admin: 07/15/20 23:23 Dose:  100 mls/hr


   Documented by: 


Sodium Chloride (Normal Saline)  1,000 mls @ 50 mls/hr IV ASDIRECTED [facility]


Sodium Chloride (Normal Saline)  1,000 mls @ 100 mls/hr IV ASDIRECTED [facility]


   Last Admin: 07/19/20 11:32 Dose:  100 mls/hr


   Documented by: 


Magnesium Sulfate/Dextrose 1 (gm/ Premix)  100 mls @ 100 mls/hr IV ONETIME ONE


   Stop: 07/18/20 08:13


   Last Admin: 07/18/20 07:30 Dose:  100 mls/hr


   Documented by: 


Magnesium Sulfate 2 gm/ Premix  50 mls @ 25 mls/hr IV ONETIME ONE


   Stop: 07/19/20 08:12


   Last Admin: 07/19/20 06:42 Dose:  25 mls/hr


   Documented by: 


Loperamide HCl (Imodium)  4 mg PO ONETIME ONE


   Stop: 07/19/20 07:26


   Last Admin: 07/19/20 08:17 Dose:  4 mg


   Documented by: 


Lorazepam (Ativan)  0 mg IVPUSH Q20M PRN; Protocol


   PRN Reason: Withdrawal Symptoms


   Last Admin: 07/15/20 21:22 Dose:  2 mg


   Documented by: 


Lorazepam (Ativan)  0 mg IVPUSH Q2H PRN; Protocol


   PRN Reason: Withdrawal Symptoms


Lorazepam (Ativan)  0 mg IVPUSH Q20M PRN; Protocol


   PRN Reason: Withdrawal Symptoms


   Last Admin: 07/16/20 08:31 Dose:  2 mg


   Documented by: 


Multivitamins (Thera)  1 each PO BEDTIME [facility]


   Last Admin: 07/17/20 21:39 Dose:  Not Given


   Documented by: 


Ondansetron HCl (Zofran)  4 mg IVPUSH ONETIME ONE


   Stop: 07/15/20 15:18


   Last Admin: 07/15/20 15:29 Dose:  4 mg


   Documented by: 


Quetiapine Fumarate (Seroquel)  50 mg PO ONETIME ONE


   Stop: 07/19/20 09:01


   Last Admin: 07/19/20 08:17 Dose:  50 mg


   Documented by: 











- Exam


General: Alert, Oriented, Cooperative, No Acute Distress


HEENT: Pupils Equal, Pupils Reactive, EOMI, Mucous Membr. Moist/Pink


Neck: Supple


Lungs: Clear to Auscultation, Normal Respiratory Effort


Cardiovascular: Regular Rate, Regular Rhythm


GI/Abdominal Exam: Normal Bowel Sounds, Soft, Non-Tender, No Organomegaly, No 

Distention, No Abnormal Bruit, Other (Obese)


 (Male) Exam: Deferred


Back Exam: Normal Inspection, Decreased Range of Motion


Extremities: Normal Inspection, Normal Range of Motion, Non-Tender, No Pedal 

Edema, Normal Capillary Refill


Peripheral Pulses: 2+: Dorsalis Pedis (L), Dorsalis Pedis (R)


Skin: Warm, Dry, Intact


Neurological: No New Focal Deficit.  No: Normal Gait


Psy/Mental Status: Alert, Normal Affect, Normal Mood





Sepsis Event Note





- Evaluation


Sepsis Screening Result: No Definite Risk





- Focused Exam


Vital Signs: 


                                   Vital Signs











  Temp Temp Pulse Resp BP Pulse Ox


 


 07/20/20 04:34  37.1 C   88  24 H  128/79  99


 


 07/20/20 02:33   36.8 C    


 


 07/20/20 01:34    76    95


 


 07/19/20 23:19  36.7 C   93  20  129/79  96


 


 07/19/20 22:40   37.4 C    


 


 07/19/20 21:44  37.7 C     


 


 07/19/20 21:17  37.8 C   89  24 H  125/74  96











Date Exam was Performed: 07/20/20


Time Exam was Performed: 17:26





- Problem List Review


Problem List Initiated/Reviewed/Updated: Yes





- My Orders


Last 24 Hours: 


My Active Orders





07/19/20 17:50


Patient Status [ADT] Routine 





07/19/20 17:51


Sodium Chloride 0.9% [Saline Flush]   10 ml FLUSH ASDIRECTED PRN 


Convert IV to Saline Lock [OM.PC] Stat 





07/19/20 21:00


DC Abbott Catheter [Urinary Catheter Removal] [RC] PER UNIT ROUTINE 





07/23/20 07:00


CBC W/O DIFF,HEMOGRAM [HEME] MOTH@0700 





07/27/20 07:00


CBC W/O DIFF,HEMOGRAM [HEME] MOTH@0700 





07/30/20 07:00


CBC W/O DIFF,HEMOGRAM [HEME] MOTH@0700 08/03/20 07:00


CBC W/O DIFF,HEMOGRAM [HEME] MOTH@0700 08/06/20 07:00


CBC W/O DIFF,HEMOGRAM [HEME] MOTH@0700 














- Plan


Plan:: 


This is a 53 yo white male with past medical hx/o chronic alcoholism, 

hypertension, hyperlipidemia, GERD, and depression who was brought in by his 

wife due to acute confusion with withdrawal symptoms.





Assessment:


Acute:


* Delirium Tremens/Withdrawal Symptoms. He carries a hx/o chronic alcoholism. He

   is not very forth coming and it appears he hides his drinking from his 

  family. he drinks hard liquor. His wife found 1.25L of Vodka. Per family, his 

  drinking has gotten out of hand since January. HAd a rough night last night. 

  He was apparently aggressive. CIWAA is mld-moderate at this point. Continue 

  Seroquel and Topamax tonight for maintenance.





* Acute confusion 2/2 metabolic/toxic encephalopathy. Has abnormal e-lytes and 

  UA/UDS pending. Head CT scan and Brain MRI done in ED are both negative for 

  acute abnormal findings. This could be mainly to alcohol abuse. He is way much

   better cognitive wise. We will continue monitor.





* Acute urinary retention. Had > 700 ml of urine residual after abbott cath was 

  placed in. No hx/o enlarged prostate. Will continue with abbott cath until he 

  is fully alert and awake. Not ready to remove. Abbott cath was removed 

  yesterday. No issues with voiding.





* Obesity Class II. Carries a BMI of 33.9. Dietician consult for weight 

  management.





* Mild Proteinuria. This is likely due to his long standing hypertension. He was

   on hctz/lisinopril for maintenance medications. If no changes on his renal p

  dain, we may be able to resume lisinopril in AM.





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 Findings on renal 

  US. He has no back pain or acute urinary complaints. UA is clean for in

  fection. Sodium, AG, and CO2 improved. No hematuria on catheter bag. VCUG is a

   consideration but would need contrast. Will obtain chart from PCP to check if

   he has chronic renal insufficiency. Abdominal CT scan report read as cortical

   cyst within the left kidney causing the questioned dilated renal pelvis on 

  recent U/S. No hydronephrosis seen. No renal calculi or ureteral calculi seen.

   No additional management needed at this point. This can be followed up 

  outpatient.





* Fatty Liver infiltrate. Abnormal finding noted on Abdominal/Pelvis CT scan. 

  This is likely due to chronic alcoholism. Dietician's consult for proper diet.

   Will  on LSM once fully alert and awake. 





Resolved: 


* Hypovolemia Hyperosmolar Hyponatremia. This is likely due to etoh abuse and 

  not able to eat of drink adequately. He is volume depleted. he comes in with a

   NA level of 133. Although this could be affected by his HCTZ and Zoloft he 

  takes for maintenance medications. Resolved. Continue to hold HCTZ.





* Increased AG Metabolic Acidosis. This is 2/2 ETOH Abuse. he is afebrile and no

   signs of systemic infection. Expect to improve with hydration. Resolved.





* Hyperglycemia. He comes to us with BS level of 172. He carries no hx/o glucose

   intolerance or diabetes. Now back to normal range.





* Transaminitis. He comes with AST and ALT levels of  respectively. This is 

  likely due to etoh abuse. Now back to normal range. 





* Mild Leukocytosis. This is likely due to stress. He is afebrile w/o signs of 

  systemic infection. Afebrile and UA is negative. Now back to normal range. 





* Mild Thrombocytopenia. He comes in with an elevated platelet level of 493. 

  This is due to chronic alcohol use. Now back to normal range. 





* Hypocalcemia. He came to us with an initial level of 8.1. Now resolved at 8.8.

   He had a small sandwich yesterday. Expect to improve once he is fully eating 

  and drinking.





* Acute on chronic CKD, unknown stage. This is likely 2/2 volume depletion and 

  dehydration from etoh abuse. No previous level for comparison. He comes in 

  with a CR of 2.6 with a BUN of 49. So far he has received 2L of NS in ED. It 

  appears he might have an underlying CKD. Had D5-1/2 NS overnight. We will 

  switch to 1L NS at 100/hr. Renal U/S report read as dilated left renal pelvis:

   uncertain if due to functional UPJ obstruction or hydronephrosis, both 

  resistivity indices are normal within both kidneys. His renal panel seems to 

  be back at his baseline-likely stage 2. 





* Mild Hypomagnesemia. This is 2/2 inadequate intake. He is now within normal 

  range.





* Non-specific fever. We will monitor.





 Chronic: 


* HTN


* HLD


* GERD


* Depression


* Chronic Alcoholism





Plan: Continue CIWAA protocol. Thiamine, folic acid and MVI. IV fluid for 

hydration. Regular diet. DVT/GI Prophylaxis. Off 1:1 for now. Fall precautions. 

Tele-psych consult with Dr. Muñiz today. YEHUDA/NATHANIEL for discharge planning. Again 

updated his wife about his much improved clinical status. Discharge pending 

psych consult and placement to chemical rehab. We may consider 24hr hold in case

 he changes his mind.

## 2020-07-21 RX ADMIN — HEPARIN SODIUM SCH: 5000 INJECTION, SOLUTION INTRAVENOUS; SUBCUTANEOUS at 09:33

## 2020-07-21 RX ADMIN — THERA TABS SCH EACH: TAB at 20:37

## 2020-07-21 RX ADMIN — HEPARIN SODIUM SCH UNITS: 5000 INJECTION, SOLUTION INTRAVENOUS; SUBCUTANEOUS at 06:23

## 2020-07-21 RX ADMIN — HEPARIN SODIUM SCH UNITS: 5000 INJECTION, SOLUTION INTRAVENOUS; SUBCUTANEOUS at 13:10

## 2020-07-21 RX ADMIN — HEPARIN SODIUM SCH UNITS: 5000 INJECTION, SOLUTION INTRAVENOUS; SUBCUTANEOUS at 20:40

## 2020-07-21 NOTE — PCM.PN
- General Info


Date of Service: 07/21/20


Admission Dx/Problem (Free Text): 


ETOH Withdrawal


Subjective Update: 





7/21/2020: Patient had some confusion last night and was starting to leave.  

This morning he is doing well, slept well, and has a good appetite.  Currently 

he is in agreement with plan to go to inpatient rehab.





7/20/20: He slept well and had an uneventful night. However he had a temp as 

high as 100.8T early morning hours. He had not signs of ongoing infection. No 

chills, no cough, or signs of upper respiratory infection. No dysuria.





7/19/20: He had a rough night. Per night nurse, he was extreme;y confused and 

hallucinating. He ended up receiving a total of 3-4 mg of Ativan, Clonodine 0.1 

mg x 3 and a one time dose of 5 mg Haldol. His CIWAA has been 8-9.   





7/18/20: Patient is still in withdrawal. He is confused and having visual 

hallucinations. He has received haldol and ativan round the clock overnight. He 

ate a small piece of sandwich at about 5 PM but nothing last night. He is 

however awake but not fully alert and able to engage (i.e. talk ) this morning. 

Updated his wife about his clinical progress. His Mg is down to1.7 and his renal

panel improved. His CIWAA is bet 9-15.





7/17/20: Patient detoxed hard last night. He was getting ativan round the clock 

starting at 11 pm. He is comfortably resting this morning. His CIWAA is bet 7-

10. His UA and UDS were negative. 


Functional Status: Reports: Pain Controlled





- Review of Systems


General: Reports: No Symptoms


HEENT: Reports: No Symptoms


Pulmonary: Reports: No Symptoms


Cardiovascular: Reports: No Symptoms


Gastrointestinal: Reports: No Symptoms


Musculoskeletal: Reports: No Symptoms





- Patient Data


Vitals - Most Recent: 


                                Last Vital Signs











Temp  97.3 F   07/21/20 08:16


 


Pulse  92   07/21/20 09:31


 


Resp  20   07/21/20 08:16


 


BP  134/97 H  07/21/20 09:31


 


Pulse Ox  97   07/21/20 08:16











Weight - Most Recent: 97.25 kg


I&O - Last 24 Hours: 


                                 Intake & Output











 07/20/20 07/21/20 07/21/20





 22:59 06:59 14:59


 


Intake Total 1500 600 


 


Output Total 300  


 


Balance 1200 600 











Med Orders - Current: 


                               Current Medications





Acetaminophen (Tylenol)  650 mg PO Q6HR PRN


   PRN Reason: Pain/Fever


   Last Admin: 07/19/20 21:44 Dose:  650 mg


   Documented by: 


Albuterol/Ipratropium (Duoneb 3.0-0.5 Mg/3 Ml)  3 ml NEB Q4H PRN


   PRN Reason: Shortness Of Breath/wheezing


Bisacodyl (Dulcolax)  5 mg PO DAILY PRN


   PRN Reason: Constipation


Clonidine HCl (Catapres)  0.1 mg PO Q4H PRN


   PRN Reason: Agitation


   Last Admin: 07/19/20 05:59 Dose:  0.1 mg


   Documented by: 


Docusate Sodium (Colace)  100 mg PO BID PRN


   PRN Reason: Constipation


   Last Admin: 07/19/20 21:34 Dose:  100 mg


   Documented by: 


Fluoxetine HCl (Prozac)  20 mg PO DAILY [facility]


   Last Admin: 07/21/20 09:31 Dose:  20 mg


   Documented by: 


Folic Acid (Folic Acid)  1 mg PO BEDTIME [facility]


   Last Admin: 07/20/20 22:13 Dose:  Not Given


   Documented by: 


Heparin Sodium (Porcine) (Heparin Sodium)  5,000 units SUBCUT Q8H [facility]


   Last Admin: 07/21/20 09:33 Dose:  Not Given


   Documented by: 


Hydrochlorothiazide (Hydrochlorothiazide)  12.5 mg PO DAILY [facility]


   Last Admin: 07/21/20 09:31 Dose:  12.5 mg


   Documented by: 


Promethazine HCl 12.5 mg/ (Sodium Chloride)  50.5 mls @ 100 mls/hr IV Q6H PRN


   PRN Reason: Nausea/Vomiting


   Last Admin: 07/15/20 20:30 Dose:  100 mls/hr


   Documented by: 


Lisinopril (Prinivil)  20 mg PO DAILY [facility]


   Last Admin: 07/21/20 09:30 Dose:  20 mg


   Documented by: 


Lorazepam (Ativan)  0 mg IVPUSH Q1H PRN; Protocol


   PRN Reason: Withdrawal Symptoms


   Last Admin: 07/20/20 21:31 Dose:  1 mg


   Documented by: 


Metoprolol Succinate (Toprol Xl)  50 mg PO DAILY [facility]


   Last Admin: 07/21/20 09:31 Dose:  50 mg


   Documented by: 


Metoprolol Tartrate (Lopressor)  5 mg IVPUSH Q4H PRN


   PRN Reason: Tachycardia


Multivitamins (Thera)  1 each PO BEDTIME [facility]


   Last Admin: 07/20/20 22:13 Dose:  Not Given


   Documented by: 


Ondansetron HCl (Zofran)  4 mg IV Q6H PRN


   PRN Reason: Nausea/Vomiting


Pantoprazole Sodium (Protonix***)  40 mg PO DAILY [facility]


   Last Admin: 07/21/20 09:33 Dose:  40 mg


   Documented by: 


Quetiapine Fumarate (Seroquel)  50 mg PO BEDTIME [facility]


   Last Admin: 07/20/20 22:13 Dose:  Not Given


   Documented by: 


Senna/Docusate Sodium (Senna Plus)  1 tab PO BID PRN


   PRN Reason: Constipation


   Last Admin: 07/19/20 21:46 Dose:  1 tab


   Documented by: 


Sodium Chloride (Saline Flush)  10 ml FLUSH ASDIRECTED PRN


   PRN Reason: Keep Vein Open


Thiamine HCl (Vitamin B-1)  100 mg PO BEDTIME [facility]


   Last Admin: 07/20/20 22:13 Dose:  Not Given


   Documented by: 


Topiramate (Topamax)  25 mg PO BID [facility]


   Last Admin: 07/21/20 09:32 Dose:  25 mg


   Documented by: 





Discontinued Medications





Chlordiazepoxide HCl (Librium)  75 mg PO ONETIME ONE


   Stop: 07/15/20 20:54


   Last Admin: 07/15/20 20:57 Dose:  75 mg


   Documented by: 


Chlordiazepoxide HCl (Librium)  50 mg PO Q4H [facility]


   Stop: 07/16/20 18:01


   Last Admin: 07/16/20 02:31 Dose:  Not Given


   Documented by: 


Chlordiazepoxide HCl (Librium)  50 mg PO Q6H [facility]


   Stop: 07/17/20 18:01


Chlordiazepoxide HCl (Librium)  25 mg PO Q4H [facility]


   Stop: 07/18/20 20:01


Chlordiazepoxide HCl (Librium)  25 mg PO Q6H [facility]


   Stop: 07/19/20 18:01


Diphenhydramine HCl (Benadryl)  25 mg IVPUSH ONETIME ONE


   Stop: 07/15/20 19:02


   Last Admin: 07/15/20 20:22 Dose:  25 mg


   Documented by: 


Diphenhydramine HCl (Benadryl)  25 mg IVPUSH ONETIME ONE


   Stop: 07/16/20 21:01


   Last Admin: 07/16/20 20:51 Dose:  25 mg


   Documented by: 


Haloperidol Lactate (Haldol)  2 mg IM Q4H PRN


   PRN Reason: Agitation


   Last Admin: 07/17/20 23:55 Dose:  2 mg


   Documented by: 


Haloperidol Lactate (Haldol)  5 mg IVPUSH ONETIME ONE


   Stop: 07/19/20 04:39


   Last Admin: 07/19/20 04:45 Dose:  5 mg


   Documented by: 


Sodium Chloride (Normal Saline)  1,000 mls @ 999 mls/hr IV ASDIRECTED [facility]


   Last Admin: 07/15/20 15:29 Dose:  999 mls/hr


   Documented by: 


Sodium Chloride (Normal Saline)  1,000 mls @ 999 mls/hr IV ASDIRECTED [facility]


   Last Admin: 07/15/20 17:29 Dose:  999 mls/hr


   Documented by: 


Dextrose/Sodium Chloride (Dextrose 5%-1/2 Ns)  1,000 mls @ 100 mls/hr IV 

ASDIRECTED [facility]


   Last Admin: 07/15/20 23:23 Dose:  100 mls/hr


   Documented by: 


Sodium Chloride (Normal Saline)  1,000 mls @ 50 mls/hr IV ASDIRECTED [facility]


Sodium Chloride (Normal Saline)  1,000 mls @ 100 mls/hr IV ASDIRECTED [facility]


   Last Admin: 07/19/20 11:32 Dose:  100 mls/hr


   Documented by: 


Magnesium Sulfate/Dextrose 1 (gm/ Premix)  100 mls @ 100 mls/hr IV ONETIME ONE


   Stop: 07/18/20 08:13


   Last Admin: 07/18/20 07:30 Dose:  100 mls/hr


   Documented by: 


Magnesium Sulfate 2 gm/ Premix  50 mls @ 25 mls/hr IV ONETIME ONE


   Stop: 07/19/20 08:12


   Last Admin: 07/19/20 06:42 Dose:  25 mls/hr


   Documented by: 


Loperamide HCl (Imodium)  4 mg PO ONETIME ONE


   Stop: 07/19/20 07:26


   Last Admin: 07/19/20 08:17 Dose:  4 mg


   Documented by: 


Lorazepam (Ativan)  0 mg IVPUSH Q20M PRN; Protocol


   PRN Reason: Withdrawal Symptoms


   Last Admin: 07/15/20 21:22 Dose:  2 mg


   Documented by: 


Lorazepam (Ativan)  0 mg IVPUSH Q2H PRN; Protocol


   PRN Reason: Withdrawal Symptoms


Lorazepam (Ativan)  0 mg IVPUSH Q20M PRN; Protocol


   PRN Reason: Withdrawal Symptoms


   Last Admin: 07/16/20 08:31 Dose:  2 mg


   Documented by: 


Lorazepam (Ativan)  1 mg IVPUSH ONETIME ONE


   Stop: 07/20/20 17:01


   Last Admin: 07/20/20 17:14 Dose:  1 mg


   Documented by: 


Lorazepam (Ativan)  1 mg IVPUSH ONETIME ONE


   Stop: 07/20/20 18:28


   Last Admin: 07/20/20 19:32 Dose:  1 mg


   Documented by: 


Multivitamins (Thera)  1 each PO BEDTIME [facility]


   Last Admin: 07/17/20 21:39 Dose:  Not Given


   Documented by: 


Ondansetron HCl (Zofran)  4 mg IVPUSH ONETIME ONE


   Stop: 07/15/20 15:18


   Last Admin: 07/15/20 15:29 Dose:  4 mg


   Documented by: 


Quetiapine Fumarate (Seroquel)  50 mg PO ONETIME ONE


   Stop: 07/19/20 09:01


   Last Admin: 07/19/20 08:17 Dose:  50 mg


   Documented by: 


Sodium Chloride (Saline Flush)  10 ml FLUSH ASDIRECTED PRN


   PRN Reason: Keep Vein Open


   Last Admin: 07/15/20 15:31 Dose:  10 ml


   Documented by: 











- Exam


General: Alert, Oriented


HEENT: Pupils Equal, Mucous Membr. Moist/Pink


Neck: Supple


Lungs: Clear to Auscultation, Normal Respiratory Effort


Cardiovascular: Regular Rate, Regular Rhythm


GI/Abdominal Exam: Normal Bowel Sounds, Soft, Non-Tender, No Organomegaly, No 

Distention


Extremities: Normal Inspection, Normal Range of Motion, Non-Tender, No Pedal 

Edema, Normal Capillary Refill


Skin: Warm, Dry, Intact


Neurological: No New Focal Deficit


Psy/Mental Status: Alert, Normal Affect, Normal Mood





Sepsis Event Note





- Evaluation


Sepsis Screening Result: No Definite Risk





- Focused Exam


Vital Signs: 


                                   Vital Signs











  Temp Pulse Resp BP Pulse Ox


 


 07/21/20 09:31   92   134/97 H 


 


 07/21/20 09:30     134/97 H 


 


 07/21/20 08:16  97.3 F  92  20  134/97 H  97











Date Exam was Performed: 07/21/20


Time Exam was Performed: 13:41





- Problem List Review


Problem List Initiated/Reviewed/Updated: Yes





- Plan


Plan:: 


This is a 53 yo white male with past medical hx/o chronic alcoholism, 

hypertension, hyperlipidemia, GERD, and depression who was brought in by his 

wife due to acute confusion with withdrawal symptoms.





Assessment:


Acute:


* Delirium Tremens/Withdrawal Symptoms. He carries a hx/o chronic alcoholism. He

  is not very forth coming and it appears he hides his drinking from his family.

  he drinks hard liquor. His wife found 1.25L of Vodka. Per family, his drinking

  has gotten out of hand since January. Had a rough night last night. He 

  apparently wanted to leave. CIWAA is mld-moderate at this point. Continue 

  Seroquel and Topamax tonight for maintenance.  Was seen by Dr. Muñiz in 

  psychiatry who recommended Prozac 20 mg daily.





* 





* 





* Obesity Class II. Carries a BMI of 33.9. Dietician consult for weight 

  management.





* Mild Proteinuria. This is likely due to his long standing hypertension. He was

  on hctz/lisinopril for maintenance medications. If no changes on his renal 

  panel, we may be able to resume lisinopril in AM.





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 Findings on renal 

  US. He has no back pain or acute urinary complaints. UA is clean for 

  infection. Sodium, AG, and CO2 improved. No hematuria on catheter bag. VCUG is

  a consideration but would need contrast. Will obtain chart from PCP to check 

  if he has chronic renal insufficiency. Abdominal CT scan report read as 

  cortical cyst within the left kidney causing the questioned dilated renal 

  pelvis on recent U/S. No hydronephrosis seen. No renal calculi or ureteral 

  calculi seen. No additional management needed at this point. This can be 

  followed up outpatient.





* Fatty Liver infiltrate. Abnormal finding noted on Abdominal/Pelvis CT scan. 

  This is likely due to chronic alcoholism. Dietician's consult for proper diet.

  Will  on LSM once fully alert and awake. 





Resolved: 


* Acute urinary retention. Had > 700 ml of urine residual after abbott cath was 

  placed in. No hx/o enlarged prostate. Abbott cath was removed. No issues with 

  voiding.





* Acute confusion 2/2 metabolic/toxic encephalopathy. Has abnormal e-lytes and 

  UA/UDS negative. Head CT scan and Brain MRI done in ED are both negative for 

  acute abnormal findings. This could be mainly to alcohol abuse. He is way much

  better cognitive wise. We will continue monitor.





* Hypovolemia Hyperosmolar Hyponatremia. This is likely due to etoh abuse and 

  not able to eat of drink adequately. He is volume depleted. he comes in with a

  NA level of 133. Although this could be affected by his HCTZ and Zoloft he 

  takes for maintenance medications. Resolved. Continue to hold HCTZ.





* Increased AG Metabolic Acidosis. This is 2/2 ETOH Abuse. he is afebrile and no

  signs of systemic infection. Expect to improve with hydration. Resolved.





* Hyperglycemia. He comes to us with BS level of 172. He carries no hx/o glucose

  intolerance or diabetes. Now back to normal range.





* Transaminitis. He comes with AST and ALT levels of  respectively. This is 

  likely due to etoh abuse. Now back to normal range. 





* Mild Leukocytosis. This is likely due to stress. He is afebrile w/o signs of 

  systemic infection. Afebrile and UA is negative. Now back to normal range. 





* Mild Thrombocytopenia. He comes in with an elevated platelet level of 493. 

  This is due to chronic alcohol use. Now back to normal range. 





* Hypocalcemia. He came to us with an initial level of 8.1. Now resolved at 8.8.

  He had a small sandwich yesterday. Expect to improve once he is fully eating 

  and drinking.





* Acute on chronic CKD, unknown stage. This is likely 2/2 volume depletion and 

  dehydration from etoh abuse. No previous level for comparison. He comes in 

  with a CR of 2.6 with a BUN of 49. So far he has received 2L of NS in ED. It 

  appears he might have an underlying CKD. Had D5-1/2 NS overnight. We will 

  switch to 1L NS at 100/hr. Renal U/S report read as dilated left renal pelvis:

  uncertain if due to functional UPJ obstruction or hydronephrosis, both 

  resistivity indices are normal within both kidneys. His renal panel seems to 

  be back at his baseline-likely stage 2. 





* Mild Hypomagnesemia. This is 2/2 inadequate intake. He is now within normal 

  range.





* Non-specific fever. We will monitor.





 Chronic: 


* HTN


* HLD


* GERD


* Depression


* Chronic Alcoholism





Plan: Continue CIWAA protocol. Thiamine, folic acid and MVI. Regular diet. 

DVT/GI Prophylaxis. Off 1:1 for now. Fall precautions. SW/CM for discharge 

planning.  Plan to discharge tomorrow to alcohol rehab facility.

## 2020-07-22 RX ADMIN — HEPARIN SODIUM SCH UNITS: 5000 INJECTION, SOLUTION INTRAVENOUS; SUBCUTANEOUS at 06:36

## 2020-07-22 NOTE — [id number]
<Ag Fall - Last Filed: 07/22/20 08:28>





**Discharge Summary





- Hospital Course


Diagnosis: Stroke: No





- Discharge Data


Discharge Disposition: DC/Tfer to [facility]


Condition: Good





- Referral to Home Health


Primary Care Physician: 


Sree Mckeon Jr, MD








- Patient Summary/Data


Consults: 


                                  Consultations





07/15/20 18:50


Consult to Case Management/ [CONS] Routine 


Consult to Physician [CONS] Routine 





07/16/20 10:22


Consult for Substance Abuse [CONS] Routine 


Consult to Dietary [Consult to Dietician] [CONS] Routine 





07/18/20 17:52


OT Evaluation and Treatment [CONS] Routine 


PT Evaluation and Treatment [CONS] Routine 





07/18/20 17:54


Consult to Speech Language Pathology [SLP Evaluation and Treatment] [CONS] 

Routine 





07/20/20 15:06


Consult to Spiritual Care [CONS] Routine 














- Discharge Plan


Prescriptions/Med Rec: 


FLUoxetine HCl [Fluoxetine HCl] 20 mg PO DAILY #30 tablet


Folic Acid 1 mg PO BEDTIME #30 tablet


Lisinopril/Hydrochlorothiazide [Lisinopril-Hctz 20-25 mg Tab] 1 each PO QAM #30 

tablet


Omeprazole 1 tab PO ACBREAKFAST #30


QUEtiapine [SEROquel] 50 mg PO BEDTIME #30 tablet


Multivitamins,Therapeutic [Thera] 1 each PO BEDTIME #30 tablet


Topiramate [Topamax] 25 mg PO BID #60 tablet


Thiamine [Vitamin B-1] 100 mg PO BEDTIME #30 tablet


Home Medications: 


                                    Home Meds





Metoprolol Succinate [Toprol XL 50mg] 50 mg PO DAILY 07/15/20 [History]


FLUoxetine HCl [Fluoxetine HCl] 20 mg PO DAILY #30 tablet 07/22/20 [Rx]


Folic Acid 1 mg PO BEDTIME #30 tablet 07/22/20 [Rx]


Lisinopril/Hydrochlorothiazide [Lisinopril-Hctz 20-25 mg Tab] 1 each PO QAM #30 

tablet 07/22/20 [Rx]


Multivitamins,Therapeutic [Thera] 1 each PO BEDTIME #30 tablet 07/22/20 [Rx]


Omeprazole 1 tab PO ACBREAKFAST #30 07/22/20 [Rx]


QUEtiapine [SEROquel] 50 mg PO BEDTIME #30 tablet 07/22/20 [Rx]


Thiamine [Vitamin B-1] 100 mg PO BEDTIME #30 tablet 07/22/20 [Rx]


Topiramate [Topamax] 25 mg PO BID #60 tablet 07/22/20 [Rx]








Referrals: 


Sree Mckeon Jr, MD [Primary Care Provider] - 





- General Info


Date of Service: 07/22/20


Admission Dx/Problem (Free Text: 


ETOH Withdrawal


Functional Status: Reports: Pain Controlled, Tolerating Diet, Ambulating, 

Urinating.  Denies: New Symptoms





- Review of Systems


General: Reports: No Symptoms.  Denies: Fever, Weakness, Fatigue, Malaise, 

Chills


HEENT: Reports: No Symptoms.  Denies: Headaches, Sore Throat


Pulmonary: Reports: No Symptoms.  Denies: Shortness of Breath, Pleuritic Chest 

Pain, Cough, Sputum, Wheezing


Cardiovascular: Reports: No Symptoms.  Denies: Chest Pain, Palpitations, Dyspnea

on Exertion


Gastrointestinal: Reports: No Symptoms.  Denies: Abdominal Pain, Constipation, 

Diarrhea, Nausea, Vomiting


Genitourinary: Reports: No Symptoms.  Denies: Pain


Musculoskeletal: Reports: No Symptoms


Skin: Reports: No Symptoms.  Denies: Cyanosis


Neurological: Reports: No Symptoms.  Denies: Confusion, Difficulty Walking, Gait

Disturbance


Psychiatric: Reports: No Symptoms, Other (Much more alert today ).  Denies: Mood

Lability, Agitation, Hallucinations





- Patient Data


Vitals - Most Recent: 


                                Last Vital Signs











Temp  98.1 F   07/22/20 03:18


 


Pulse  74   07/22/20 03:18


 


Resp  18   07/22/20 03:18


 


BP  128/91 H  07/22/20 03:18


 


Pulse Ox  96   07/22/20 03:18











Weight - Most Recent: 97.205 kg


I&O - Last 24 hours: 


                                 Intake & Output











 07/21/20 07/22/20 07/22/20





 22:59 06:59 14:59


 


Intake Total 300 1000 


 


Output Total 1300 1200 


 


Balance -1000 -200 











Med Orders - Current: 


                               Current Medications





Acetaminophen (Tylenol)  650 mg PO Q6HR PRN


   PRN Reason: Pain/Fever


   Last Admin: 07/19/20 21:44 Dose:  650 mg


   Documented by: 


Albuterol/Ipratropium (Duoneb 3.0-0.5 Mg/3 Ml)  3 ml NEB Q4H PRN


   PRN Reason: Shortness Of Breath/wheezing


Bisacodyl (Dulcolax)  5 mg PO DAILY PRN


   PRN Reason: Constipation


Clonidine HCl (Catapres)  0.1 mg PO Q4H PRN


   PRN Reason: Agitation


   Last Admin: 07/19/20 05:59 Dose:  0.1 mg


   Documented by: 


Docusate Sodium (Colace)  100 mg PO BID PRN


   PRN Reason: Constipation


   Last Admin: 07/21/20 20:38 Dose:  100 mg


   Documented by: 


Fluoxetine HCl (Prozac)  20 mg PO DAILY [facility]


   Last Admin: 07/21/20 09:31 Dose:  20 mg


   Documented by: 


Folic Acid (Folic Acid)  1 mg PO BEDTIME [facility]


   Last Admin: 07/21/20 20:37 Dose:  1 mg


   Documented by: 


Heparin Sodium (Porcine) (Heparin Sodium)  5,000 units SUBCUT Q8H [facility]


   Last Admin: 07/22/20 06:36 Dose:  5,000 units


   Documented by: 


Hydrochlorothiazide (Hydrochlorothiazide)  12.5 mg PO DAILY [facility]


   Last Admin: 07/21/20 09:31 Dose:  12.5 mg


   Documented by: 


Promethazine HCl 12.5 mg/ (Sodium Chloride)  50.5 mls @ 100 mls/hr IV Q6H PRN


   PRN Reason: Nausea/Vomiting


   Last Admin: 07/15/20 20:30 Dose:  100 mls/hr


   Documented by: 


Lisinopril (Prinivil)  20 mg PO DAILY [facility]


   Last Admin: 07/21/20 09:30 Dose:  20 mg


   Documented by: 


Lorazepam (Ativan)  0 mg IVPUSH Q1H PRN; Protocol


   PRN Reason: Withdrawal Symptoms


   Last Admin: 07/20/20 21:31 Dose:  1 mg


   Documented by: 


Metoprolol Succinate (Toprol Xl)  50 mg PO DAILY [facility]


   Last Admin: 07/21/20 09:31 Dose:  50 mg


   Documented by: 


Metoprolol Tartrate (Lopressor)  5 mg IVPUSH Q4H PRN


   PRN Reason: Tachycardia


Multivitamins (Thera)  1 each PO BEDTIME [facility]


   Last Admin: 07/21/20 20:37 Dose:  1 each


   Documented by: 


Ondansetron HCl (Zofran)  4 mg IV Q6H PRN


   PRN Reason: Nausea/Vomiting


Pantoprazole Sodium (Protonix***)  40 mg PO DAILY [facility]


   Last Admin: 07/21/20 09:33 Dose:  40 mg


   Documented by: 


Quetiapine Fumarate (Seroquel)  50 mg PO BEDTIME [facility]


   Last Admin: 07/21/20 20:39 Dose:  50 mg


   Documented by: 


Senna/Docusate Sodium (Senna Plus)  1 tab PO BID PRN


   PRN Reason: Constipation


   Last Admin: 07/19/20 21:46 Dose:  1 tab


   Documented by: 


Sodium Chloride (Saline Flush)  10 ml FLUSH ASDIRECTED PRN


   PRN Reason: Keep Vein Open


Thiamine HCl (Vitamin B-1)  100 mg PO BEDTIME [facility]


   Last Admin: 07/21/20 20:38 Dose:  100 mg


   Documented by: 


Topiramate (Topamax)  25 mg PO BID [facility]


   Last Admin: 07/21/20 20:38 Dose:  25 mg


   Documented by: 





Discontinued Medications





Chlordiazepoxide HCl (Librium)  75 mg PO ONETIME ONE


   Stop: 07/15/20 20:54


   Last Admin: 07/15/20 20:57 Dose:  75 mg


   Documented by: 


Chlordiazepoxide HCl (Librium)  50 mg PO Q4H [facility]


   Stop: 07/16/20 18:01


   Last Admin: 07/16/20 02:31 Dose:  Not Given


   Documented by: 


Chlordiazepoxide HCl (Librium)  50 mg PO Q6H [facility]


   Stop: 07/17/20 18:01


Chlordiazepoxide HCl (Librium)  25 mg PO Q4H [facility]


   Stop: 07/18/20 20:01


Chlordiazepoxide HCl (Librium)  25 mg PO Q6H [facility]


   Stop: 07/19/20 18:01


Diphenhydramine HCl (Benadryl)  25 mg IVPUSH ONETIME ONE


   Stop: 07/15/20 19:02


   Last Admin: 07/15/20 20:22 Dose:  25 mg


   Documented by: 


Diphenhydramine HCl (Benadryl)  25 mg IVPUSH ONETIME ONE


   Stop: 07/16/20 21:01


   Last Admin: 07/16/20 20:51 Dose:  25 mg


   Documented by: 


Haloperidol Lactate (Haldol)  2 mg IM Q4H PRN


   PRN Reason: Agitation


   Last Admin: 07/17/20 23:55 Dose:  2 mg


   Documented by: 


Haloperidol Lactate (Haldol)  5 mg IVPUSH ONETIME ONE


   Stop: 07/19/20 04:39


   Last Admin: 07/19/20 04:45 Dose:  5 mg


   Documented by: 


Sodium Chloride (Normal Saline)  1,000 mls @ 999 mls/hr IV ASDIRECTED [facility]


   Last Admin: 07/15/20 15:29 Dose:  999 mls/hr


   Documented by: 


Sodium Chloride (Normal Saline)  1,000 mls @ 999 mls/hr IV ASDIRECTED [facility]


   Last Admin: 07/15/20 17:29 Dose:  999 mls/hr


   Documented by: 


Dextrose/Sodium Chloride (Dextrose 5%-1/2 Ns)  1,000 mls @ 100 mls/hr IV 

ASDIRECTED [facility]


   Last Admin: 07/15/20 23:23 Dose:  100 mls/hr


   Documented by: 


Sodium Chloride (Normal Saline)  1,000 mls @ 50 mls/hr IV ASDIRECTED [facility]


Sodium Chloride (Normal Saline)  1,000 mls @ 100 mls/hr IV ASDIRECTED [facility]


   Last Admin: 07/19/20 11:32 Dose:  100 mls/hr


   Documented by: 


Magnesium Sulfate/Dextrose 1 (gm/ Premix)  100 mls @ 100 mls/hr IV ONETIME ONE


   Stop: 07/18/20 08:13


   Last Admin: 07/18/20 07:30 Dose:  100 mls/hr


   Documented by: 


Magnesium Sulfate 2 gm/ Premix  50 mls @ 25 mls/hr IV ONETIME ONE


   Stop: 07/19/20 08:12


   Last Admin: 07/19/20 06:42 Dose:  25 mls/hr


   Documented by: 


Loperamide HCl (Imodium)  4 mg PO ONETIME ONE


   Stop: 07/19/20 07:26


   Last Admin: 07/19/20 08:17 Dose:  4 mg


   Documented by: 


Lorazepam (Ativan)  0 mg IVPUSH Q20M PRN; Protocol


   PRN Reason: Withdrawal Symptoms


   Last Admin: 07/15/20 21:22 Dose:  2 mg


   Documented by: 


Lorazepam (Ativan)  0 mg IVPUSH Q2H PRN; Protocol


   PRN Reason: Withdrawal Symptoms


Lorazepam (Ativan)  0 mg IVPUSH Q20M PRN; Protocol


   PRN Reason: Withdrawal Symptoms


   Last Admin: 07/16/20 08:31 Dose:  2 mg


   Documented by: 


Lorazepam (Ativan)  1 mg IVPUSH ONETIME ONE


   Stop: 07/20/20 17:01


   Last Admin: 07/20/20 17:14 Dose:  1 mg


   Documented by: 


Lorazepam (Ativan)  1 mg IVPUSH ONETIME ONE


   Stop: 07/20/20 18:28


   Last Admin: 07/20/20 19:32 Dose:  1 mg


   Documented by: 


Multivitamins (Thera)  1 each PO BEDTIME [facility]


   Last Admin: 07/17/20 21:39 Dose:  Not Given


   Documented by: 


Ondansetron HCl (Zofran)  4 mg IVPUSH ONETIME ONE


   Stop: 07/15/20 15:18


   Last Admin: 07/15/20 15:29 Dose:  4 mg


   Documented by: 


Quetiapine Fumarate (Seroquel)  50 mg PO ONETIME ONE


   Stop: 07/19/20 09:01


   Last Admin: 07/19/20 08:17 Dose:  50 mg


   Documented by: 


Sodium Chloride (Saline Flush)  10 ml FLUSH ASDIRECTED PRN


   PRN Reason: Keep Vein Open


   Last Admin: 07/15/20 15:31 Dose:  10 ml


   Documented by: 











- Exam


Quality Assessment: Reports: DVT Prophylaxis


General: Reports: Alert, Oriented, Cooperative, No Acute Distress


HEENT: Reports: Pupils Equal, Pupils Reactive, Mucous Membr. Moist/Pink


Neck: Reports: Supple, Trachea Midline


Lungs: Reports: Clear to Auscultation, Normal Respiratory Effort


Cardiovascular: Reports: Regular Rate, Regular Rhythm


GI/Abdominal Exam: Normal Bowel Sounds, Soft, Non-Tender, No Distention


 (Male) Exam: Deferred


Rectal (Males) Exam: Deferred


Back Exam: Reports: Normal Inspection, Full Range of Motion


Extremities: Normal Inspection, Normal Range of Motion, Non-Tender, No Pedal 

Edema, Normal Capillary Refill


Skin: Reports: Warm, Dry, Intact


Neurological: Reports: No New Focal Deficit


Psy/Mental Status: Reports: Alert, Depressed.  Denies: Withdrawal Symptoms





<Leanne Mcmullen III - Last Filed: 07/22/20 08:33>





**Discharge Summary





- Hospital Course


HPI Initial Comments: 





This is a 53 yo white male with past medical hx/o chronic alcoholism, 

hypertension, hyperlipidemia, GERD, and depression who was brought in by his 

wife due to acute confusion with withdrawal symptoms. His last drink was 

apparently last night but according to him it was this past Sunday. He was not 

very forthcoming with his alcohol abuse. However his wife thinks he drinks 

heavily about 1.25 L of vodka a day and hides his drinking from family. His wife

believes his drinking has gotten out of hand since January. He has had etoh 

withdrawal in the past. Per family, he was tremulous and sweating. This morning 

he was alert, awake, but confused. He was able to answer yes or no but cannot 

offer a meaning conversation. No report of visual, tactile or auditory 

hallucinations.





His initial work up in the emergency shows a CBC remarkable for WBC of 11.80, 

platelet # of 493, Neutrophils of 78.6%, Lymphocyte # of 147% and Monocytes # of

4.1%. His chemistry is significant for sodium of 133, CO2 of 19, AG of 20.2, BUN

of 49, Cr of 2.6, BS of 172, AST of 48, ALT of 77 and Total Protein of 8.4. His 

ETOH and COVID-19 screening are negative. Brain MRI and Head CT scan reports are

both negative for acute abnormal findings. Patient is coming in primarily for 

etoh withdrawal. 





This is a 53 yo white male with past medical hx/o chronic alcoholism, 

hypertension, hyperlipidemia, GERD, and depression who was brought in by his 

wife due to acute confusion with withdrawal symptoms.





Assessment:


Acute:


* ETOH Abuse/Withdrawal Symptoms. He carries a hx/o chronic alcoholism. He is 

  not very forth coming and it appears he hides his drinking from his family. he

  drinks hard liquor. His wife found 1.25L of Vodka. Per family, his drinking 

  has gotten out of hand since January. 





* Acute confusion 2/2 metabolic/toxic encephalopathy. Has abnormal e-lytes and 

  UA/UDS pending. Head CT scan and Brain MRI done in ED are both negative for 

  acute abnormal findings. This could be mainly to alcohol abuse. We will 

  monitor.





* Acute kidney injury vs CKD. This is likely 2/2 volume depletion and 

  dehydration from etoh abuse. No previous level for comparison. He comes in 

  with a CR of 2.6 with a BUN of 49. So far he has received 2L of NS in ED. 

  Plan: e-lytes studies, avoid nephrotoxic agents, monitor Is/Os, renal US to 

  r/o obstructive uropathy and repeat renal panel in AM.





* Hypovolemia Hyperosmolar Hyponatremia. This is likely due to etoh abuse and 

  not able to eat of drink adequately. He is volume depleted. he comes in with a

  NA level of 133. Although this could be affected by his HCTZ and Zoloft he 

  takes for maintenance medications. We will hold HCTZ for now and monitor 

  levels.





* Increased AG Metabolic Acidosis. This is 2/2 ETOh Abuse. he is afebrile and no

  signs of systemic infection. Expect to improve with hydration.





* Hyperglycemia. He comes to us with BS level of 172. He carries no hx/o glucose

  intolerance or diabetes. We will monitor.





* Transaminitis. He comes to us with AST and ALT levels of  respectively. This 

  is likely due to etoh abuse.He is on statin so we will hold if for now until 

  his liver enzymes improve.





* Mild Leukocytosis. This is likely due to stress. He is afebrile w/o signs of 

  systemic infection. We will monitor.





* Mild Thrombocytopenia. He comes in with an elevated platelet level of 493. 

  This is due to chronic alcohol use.


 


Chronic: 


* HTN


* HLD


* GERD


* Depression


* Chronic Alcoholism





Plan: Admit to ICU. ETOH Withdrawal orders. CIWAA protocol. Thiamine, folic acid

and MVI. IV fluid for hydration. Renal U/S in AM. Avoid neprhotxic agents. 

Regular diet. Tele-psych consult with Dr. Muñiz. /NATHANIEL for discharge planning.





- Mortality Measure


Prognosis:: Good


Diagnosis: Stroke: No





- Discharge Data


Discharge Date: 07/22/20





- Referral to Home Health


Primary Care Physician: 


Sree Mckeon Jr, MD








- Discharge Diagnosis/Problem(s)


(1) Alcohol abuse


SNOMED Code(s): 61475856


   ICD Code: F10.10 - ALCOHOL ABUSE, UNCOMPLICATED   Status: Acute   Current 

Visit: Yes   





(2) Alcohol withdrawal delirium


SNOMED Code(s): 0685469


   ICD Code: F10.231 - ALCOHOL DEPENDENCE WITH WITHDRAWAL DELIRIUM   Status: 

Acute   Current Visit: Yes   





(3) Acute renal failure (ARF)


SNOMED Code(s): 43626248


   ICD Code: N17.9 - ACUTE KIDNEY FAILURE, UNSPECIFIED   Status: Acute   Current

Visit: Yes   


Qualifiers: 


   Acute renal failure type: unspecified   Qualified Code(s): N17.9 - Acute 

kidney failure, unspecified   





(4) Altered mental state


SNOMED Code(s): 924573796


   ICD Code: R41.82 - ALTERED MENTAL STATUS, UNSPECIFIED   Status: Acute   

Current Visit: Yes   


Qualifiers: 


   Altered mental status type: disorientation   Qualified Code(s): R41.0 - 

Disorientation, unspecified   





- Patient Summary/Data


Consults: 


                                  Consultations





07/15/20 18:50


Consult to Case Management/ [CONS] Routine 


Consult to Physician [CONS] Routine 





07/16/20 10:22


Consult for Substance Abuse [CONS] Routine 


Consult to Dietary [Consult to Dietician] [CONS] Routine 





07/18/20 17:52


OT Evaluation and Treatment [CONS] Routine 


PT Evaluation and Treatment [CONS] Routine 





07/18/20 17:54


Consult to Speech Language Pathology [SLP Evaluation and Treatment] [CONS] 

Routine 





07/20/20 15:06


Consult to Spiritual Care [CONS] Routine 











Hospital Course: 





7/21/2020: Patient had some confusion last night and wanted to leave.  This 

morning he is doing well, slept well, and has a good appetite.  Currently he is 

in agreement with plan to go to inpatient rehab.





7/20/20: He slept well and had an uneventful night. However he had a temp as 

high as 100.8T early morning hours. He had not signs of ongoing infection. No 

chills, no cough, or signs of upper respiratory infection. No dysuria.





7/19/20: He had a rough night. Per night nurse, he was extreme;y confused and 

hallucinating. He ended up receiving a total of 3-4 mg of Ativan, Clonodine 0.1 

mg x 3 and a one time dose of 5 mg Haldol. His CIWAA has been 8-9.   





7/18/20: Patient is still in withdrawal. He is confused and having visual 

hallucinations. He has received haldol and ativan round the clock overnight. He 

ate a small piece of sandwich at about 5 PM but nothing last night. He is 

however awake but not fully alert and able to engage (i.e. talk ) this morning. 

Updated his wife about his clinical progress. His Mg is down to1.7 and his renal

 panel improved. His CIWAA is bet 9-15.





7/17/20: Patient detoxed hard last night. He was getting ativan round the clock 

starting at 11 pm. He is comfortably resting this morning. His CIWAA is bet 7-

10. His UA and UDS were negative. 








* Delirium Tremens/Withdrawal Symptoms. He carries a hx/o chronic alcoholism. He

   is not very forth coming and it appears he hides his drinking from his 

  family. he drinks hard liquor. His wife found 1.25L of Vodka. Per family, his 

  drinking has gotten out of hand since January. Had a rough night last night. 

  He apparently wanted to leave. CIWAA is mld-moderate at this point. Continue 

  Seroquel and Topamax tonight for maintenance.  Was seen by Dr. Muñiz in 

  psychiatry who recommended Prozac 20 mg daily.





* Obesity Class II. Carries a BMI of 33.9. Dietician consult for weight 

  management.





* Mild Proteinuria. This is likely due to his long standing hypertension. He was

   on hctz/lisinopril for maintenance medications. If no changes on his renal 

  panel, we may be able to resume lisinopril in AM.





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 Findings on renal 

  US. He has no back pain or acute urinary complaints. UA is clean for 

  infection. Sodium, AG, and CO2 improved. No hematuria on catheter bag. VCUG is

   a consideration but would need contrast. Will obtain chart from PCP to check 

  if he has chronic renal insufficiency. Abdominal CT scan report read as 

  cortical cyst within the left kidney causing the questioned dilated renal 

  pelvis on recent U/S. No hydronephrosis seen. No renal calculi or ureteral 

  calculi seen. No additional management needed at this point. This can be 

  followed up outpatient.





* Fatty Liver infiltrate. Abnormal finding noted on Abdominal/Pelvis CT scan. 

  This is likely due to chronic alcoholism. Dietician's consult for proper diet.

   Will  on LSM once fully alert and awake. 





* Acute urinary retention. Had > 700 ml of urine residual after abbott cath was 

  placed in. No hx/o enlarged prostate. Abbott cath was removed. No issues with 

  voiding.





* Acute confusion 2/2 metabolic/toxic encephalopathy. Has abnormal e-lytes and 

  UA/UDS negative. Head CT scan and Brain MRI done in ED are both negative for 

  acute abnormal findings. This could be mainly to alcohol abuse. He is way much

   better cognitive wise. We will continue monitor.





* Hypovolemia Hyperosmolar Hyponatremia. This is likely due to etoh abuse and 

  not able to eat of drink adequately. He is volume depleted. he comes in with a

   NA level of 133. Although this could be affected by his HCTZ and Zoloft he 

  takes for maintenance medications. Resolved. Continue to hold HCTZ.





* Increased AG Metabolic Acidosis. This is 2/2 ETOH Abuse. he is afebrile and no

   signs of systemic infection. Expect to improve with hydration. Resolved.





* Hyperglycemia. He comes to us with BS level of 172. He carries no hx/o glucose

   intolerance or diabetes. Now back to normal range.





* Transaminitis. He comes with AST and ALT levels of  respectively. This is 

  likely due to etoh abuse. Now back to normal range. 





* Mild Leukocytosis. This is likely due to stress. He is afebrile w/o signs of 

  systemic infection. Afebrile and UA is negative. Now back to normal range. 





* Mild Thrombocytopenia. He comes in with an elevated platelet level of 493. 

  This is due to chronic alcohol use. Now back to normal range. 





* Hypocalcemia. He came to us with an initial level of 8.1. Now resolved at 8.8.

   He had a small sandwich yesterday. Expect to improve once he is fully eating 

  and drinking.





* Acute on chronic CKD, unknown stage. This is likely 2/2 volume depletion and 

  dehydration from etoh abuse. No previous level for comparison. He comes in 

  with a CR of 2.6 with a BUN of 49. So far he has received 2L of NS in ED. It 

  appears he might have an underlying CKD. Had D5-1/2 NS overnight. We will 

  switch to 1L NS at 100/hr. Renal U/S report read as dilated left renal pelvis:

   uncertain if due to functional UPJ obstruction or hydronephrosis, both 

  resistivity indices are normal within both kidneys. His renal panel seems to 

  be back at his baseline-likely stage 2. 





* Mild Hypomagnesemia. This is 2/2 inadequate intake. He is now within normal 

  range.





* Non-specific fever. No recurrence.





 Chronic: 


* HTN


* HLD


* GERD


* Depression


* Chronic Alcoholism





- Patient Instructions


Diet: Heart Healthy Diet


Activity: As Tolerated


Driving: Do Not Drive


Showering/Bathing: May Shower





- Discharge Plan


*PRESCRIPTION DRUG MONITORING PROGRAM REVIEWED*: No


*COPY OF PRESCRIPTION DRUG MONITORING REPORT IN PATIENT FARHAT: No


Oxygen Therapy Mode: Room Air





- Discharge Summary/Plan Comment


DC Time >30 min.: No


Discharge Summary/Plan Comment: 





Discharge to inpatient alcohol rehab.


Follow up with PCP in 1 week.





- Patient Data


Vitals - Most Recent: 


                                Last Vital Signs











Temp  98.1 F   07/22/20 03:18


 


Pulse  74   07/22/20 03:18


 


Resp  18   07/22/20 03:18


 


BP  128/91 H  07/22/20 03:18


 


Pulse Ox  96   07/22/20 03:18











I&O - Last 24 hours: 


                                 Intake & Output











 07/21/20 07/22/20 07/22/20





 22:59 06:59 14:59


 


Intake Total 300 1000 


 


Output Total 1300 1200 


 


Balance -1000 -200 











Med Orders - Current: 


                               Current Medications





Acetaminophen (Tylenol)  650 mg PO Q6HR PRN


   PRN Reason: Pain/Fever


   Last Admin: 07/19/20 21:44 Dose:  650 mg


   Documented by: 


Albuterol/Ipratropium (Duoneb 3.0-0.5 Mg/3 Ml)  3 ml NEB Q4H PRN


   PRN Reason: Shortness Of Breath/wheezing


Bisacodyl (Dulcolax)  5 mg PO DAILY PRN


   PRN Reason: Constipation


Clonidine HCl (Catapres)  0.1 mg PO Q4H PRN


   PRN Reason: Agitation


   Last Admin: 07/19/20 05:59 Dose:  0.1 mg


   Documented by: 


Docusate Sodium (Colace)  100 mg PO BID PRN


   PRN Reason: Constipation


   Last Admin: 07/21/20 20:38 Dose:  100 mg


   Documented by: 


Fluoxetine HCl (Prozac)  20 mg PO DAILY [facility]


   Last Admin: 07/21/20 09:31 Dose:  20 mg


   Documented by: 


Folic Acid (Folic Acid)  1 mg PO BEDTIME [facility]


   Last Admin: 07/21/20 20:37 Dose:  1 mg


   Documented by: 


Heparin Sodium (Porcine) (Heparin Sodium)  5,000 units SUBCUT Q8H [facility]


   Last Admin: 07/22/20 06:36 Dose:  5,000 units


   Documented by: 


Hydrochlorothiazide (Hydrochlorothiazide)  12.5 mg PO DAILY [facility]


   Last Admin: 07/21/20 09:31 Dose:  12.5 mg


   Documented by: 


Promethazine HCl 12.5 mg/ (Sodium Chloride)  50.5 mls @ 100 mls/hr IV Q6H PRN


   PRN Reason: Nausea/Vomiting


   Last Admin: 07/15/20 20:30 Dose:  100 mls/hr


   Documented by: 


Lisinopril (Prinivil)  20 mg PO DAILY [facility]


   Last Admin: 07/21/20 09:30 Dose:  20 mg


   Documented by: 


Lorazepam (Ativan)  0 mg IVPUSH Q1H PRN; Protocol


   PRN Reason: Withdrawal Symptoms


   Last Admin: 07/20/20 21:31 Dose:  1 mg


   Documented by: 


Metoprolol Succinate (Toprol Xl)  50 mg PO DAILY [facility]


   Last Admin: 07/21/20 09:31 Dose:  50 mg


   Documented by: 


Metoprolol Tartrate (Lopressor)  5 mg IVPUSH Q4H PRN


   PRN Reason: Tachycardia


Multivitamins (Thera)  1 each PO BEDTIME [facility]


   Last Admin: 07/21/20 20:37 Dose:  1 each


   Documented by: 


Ondansetron HCl (Zofran)  4 mg IV Q6H PRN


   PRN Reason: Nausea/Vomiting


Pantoprazole Sodium (Protonix***)  40 mg PO DAILY [facility]


   Last Admin: 07/21/20 09:33 Dose:  40 mg


   Documented by: 


Quetiapine Fumarate (Seroquel)  50 mg PO BEDTIME [facility]


   Last Admin: 07/21/20 20:39 Dose:  50 mg


   Documented by: 


Senna/Docusate Sodium (Senna Plus)  1 tab PO BID PRN


   PRN Reason: Constipation


   Last Admin: 07/19/20 21:46 Dose:  1 tab


   Documented by: 


Sodium Chloride (Saline Flush)  10 ml FLUSH ASDIRECTED PRN


   PRN Reason: Keep Vein Open


Thiamine HCl (Vitamin B-1)  100 mg PO BEDTIME [facility]


   Last Admin: 07/21/20 20:38 Dose:  100 mg


   Documented by: 


Topiramate (Topamax)  25 mg PO BID [facility]


   Last Admin: 07/21/20 20:38 Dose:  25 mg


   Documented by: 





Discontinued Medications





Chlordiazepoxide HCl (Librium)  75 mg PO ONETIME ONE


   Stop: 07/15/20 20:54


   Last Admin: 07/15/20 20:57 Dose:  75 mg


   Documented by: 


Chlordiazepoxide HCl (Librium)  50 mg PO Q4H [facility]


   Stop: 07/16/20 18:01


   Last Admin: 07/16/20 02:31 Dose:  Not Given


   Documented by: 


Chlordiazepoxide HCl (Librium)  50 mg PO Q6H [facility]


   Stop: 07/17/20 18:01


Chlordiazepoxide HCl (Librium)  25 mg PO Q4H [facility]


   Stop: 07/18/20 20:01


Chlordiazepoxide HCl (Librium)  25 mg PO Q6H [facility]


   Stop: 07/19/20 18:01


Diphenhydramine HCl (Benadryl)  25 mg IVPUSH ONETIME ONE


   Stop: 07/15/20 19:02


   Last Admin: 07/15/20 20:22 Dose:  25 mg


   Documented by: 


Diphenhydramine HCl (Benadryl)  25 mg IVPUSH ONETIME ONE


   Stop: 07/16/20 21:01


   Last Admin: 07/16/20 20:51 Dose:  25 mg


   Documented by: 


Haloperidol Lactate (Haldol)  2 mg IM Q4H PRN


   PRN Reason: Agitation


   Last Admin: 07/17/20 23:55 Dose:  2 mg


   Documented by: 


Haloperidol Lactate (Haldol)  5 mg IVPUSH ONETIME ONE


   Stop: 07/19/20 04:39


   Last Admin: 07/19/20 04:45 Dose:  5 mg


   Documented by: 


Sodium Chloride (Normal Saline)  1,000 mls @ 999 mls/hr IV ASDIRECTED [facility]


   Last Admin: 07/15/20 15:29 Dose:  999 mls/hr


   Documented by: 


Sodium Chloride (Normal Saline)  1,000 mls @ 999 mls/hr IV ASDIRECTED [facility]


   Last Admin: 07/15/20 17:29 Dose:  999 mls/hr


   Documented by: 


Dextrose/Sodium Chloride (Dextrose 5%-1/2 Ns)  1,000 mls @ 100 mls/hr IV 

ASDIRECTED [facility]


   Last Admin: 07/15/20 23:23 Dose:  100 mls/hr


   Documented by: 


Sodium Chloride (Normal Saline)  1,000 mls @ 50 mls/hr IV ASDIRECTED [facility]


Sodium Chloride (Normal Saline)  1,000 mls @ 100 mls/hr IV ASDIRECTED [facility]


   Last Admin: 07/19/20 11:32 Dose:  100 mls/hr


   Documented by: 


Magnesium Sulfate/Dextrose 1 (gm/ Premix)  100 mls @ 100 mls/hr IV ONETIME ONE


   Stop: 07/18/20 08:13


   Last Admin: 07/18/20 07:30 Dose:  100 mls/hr


   Documented by: 


Magnesium Sulfate 2 gm/ Premix  50 mls @ 25 mls/hr IV ONETIME ONE


   Stop: 07/19/20 08:12


   Last Admin: 07/19/20 06:42 Dose:  25 mls/hr


   Documented by: 


Loperamide HCl (Imodium)  4 mg PO ONETIME ONE


   Stop: 07/19/20 07:26


   Last Admin: 07/19/20 08:17 Dose:  4 mg


   Documented by: 


Lorazepam (Ativan)  0 mg IVPUSH Q20M PRN; Protocol


   PRN Reason: Withdrawal Symptoms


   Last Admin: 07/15/20 21:22 Dose:  2 mg


   Documented by: 


Lorazepam (Ativan)  0 mg IVPUSH Q2H PRN; Protocol


   PRN Reason: Withdrawal Symptoms


Lorazepam (Ativan)  0 mg IVPUSH Q20M PRN; Protocol


   PRN Reason: Withdrawal Symptoms


   Last Admin: 07/16/20 08:31 Dose:  2 mg


   Documented by: 


Lorazepam (Ativan)  1 mg IVPUSH ONETIME ONE


   Stop: 07/20/20 17:01


   Last Admin: 07/20/20 17:14 Dose:  1 mg


   Documented by: 


Lorazepam (Ativan)  1 mg IVPUSH ONETIME ONE


   Stop: 07/20/20 18:28


   Last Admin: 07/20/20 19:32 Dose:  1 mg


   Documented by: 


Multivitamins (Thera)  1 each PO BEDTIME [facility]


   Last Admin: 07/17/20 21:39 Dose:  Not Given


   Documented by: 


Ondansetron HCl (Zofran)  4 mg IVPUSH ONETIME ONE


   Stop: 07/15/20 15:18


   Last Admin: 07/15/20 15:29 Dose:  4 mg


   Documented by: 


Quetiapine Fumarate (Seroquel)  50 mg PO ONETIME ONE


   Stop: 07/19/20 09:01


   Last Admin: 07/19/20 08:17 Dose:  50 mg


   Documented by: 


Sodium Chloride (Saline Flush)  10 ml FLUSH ASDIRECTED PRN


   PRN Reason: Keep Vein Open


   Last Admin: 07/15/20 15:31 Dose:  10 ml


   Documented by:

## 2021-03-21 ENCOUNTER — HOSPITAL ENCOUNTER (EMERGENCY)
Dept: HOSPITAL 41 - JD.ED | Age: 56
LOS: 1 days | Discharge: HOME | End: 2021-03-22
Payer: COMMERCIAL

## 2021-03-21 DIAGNOSIS — F10.229: Primary | ICD-10-CM
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
## 2021-03-21 DIAGNOSIS — R45.851: ICD-10-CM

## 2021-03-21 DIAGNOSIS — K21.9: ICD-10-CM

## 2021-03-21 DIAGNOSIS — E78.00: ICD-10-CM

## 2021-03-21 DIAGNOSIS — I10: ICD-10-CM

## 2021-03-21 DIAGNOSIS — Z79.899: ICD-10-CM

## 2021-03-21 LAB — APAP SERPL-MCNC: 0 UG/ML (ref 10–30)

## 2021-03-21 PROCEDURE — 80307 DRUG TEST PRSMV CHEM ANLYZR: CPT

## 2021-03-21 PROCEDURE — 80053 COMPREHEN METABOLIC PANEL: CPT

## 2021-03-21 PROCEDURE — 84443 ASSAY THYROID STIM HORMONE: CPT

## 2021-03-21 PROCEDURE — 36415 COLL VENOUS BLD VENIPUNCTURE: CPT

## 2021-03-21 PROCEDURE — 80179 DRUG ASSAY SALICYLATE: CPT

## 2021-03-21 PROCEDURE — 99285 EMERGENCY DEPT VISIT HI MDM: CPT

## 2021-03-21 PROCEDURE — 80143 DRUG ASSAY ACETAMINOPHEN: CPT

## 2021-03-21 PROCEDURE — 93005 ELECTROCARDIOGRAM TRACING: CPT

## 2021-03-21 PROCEDURE — 85027 COMPLETE CBC AUTOMATED: CPT

## 2021-03-21 PROCEDURE — 85007 BL SMEAR W/DIFF WBC COUNT: CPT

## 2021-03-21 NOTE — EDM.PDOCBH
<Jose Duran CARA - Last Filed: 03/22/21 06:39>





ED HPI GENERAL MEDICAL PROBLEM





- General


Chief Complaint: Behavioral/Psych


Stated Complaint: DETOX, SUICIDAL IDEATIONS


Time Seen by Provider: 03/21/21 22:21





- Related Data


                                    Allergies











Allergy/AdvReac Type Severity Reaction Status Date / Time


 


No Known Allergies Allergy   Verified 03/21/21 21:21











Home Meds: 


                                    Home Meds





Metoprolol Succinate [Toprol XL 50mg] 50 mg PO DAILY 07/15/20 [History]


FLUoxetine HCl [Fluoxetine HCl] 20 mg PO DAILY #30 tablet 07/22/20 [Rx]


Hydrochlorothiazide/Lisinopril [Lisinopril/HCTZ 20-12.5 MG] 1 tab PO DAILY 

03/21/21 [History]


Omeprazole 20 mg PO DAILY 03/21/21 [History]


Simvastatin [Zocor] 40 mg PO DAILY 03/21/21 [History]


amLODIPine [Norvasc] 5 mg PO DAILY 03/21/21 [History]








  ** #1 Interpretation


EKG Date: 03/21/21


Time: 22:12


Rhythm: NSR


Rate (Beats/Min): 69


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


P-Wave: Present (1st degree AVB)


QRS: Normal (Early transition)


ST-T: Normal


QT: Normal


Comparison: NA - No Prior EKG





COURSE, BEHAVIORAL HEALTH COMP





- Course


Medical Clearance: 





03/21/21 23:27


Case received from Chucky Lincoln NP.  I agree with the history and physical 

exam as documented.  





The patient's CBC remarkable for mild leukocytosis of 12.17, but with 0% 

bandemia.  He has mild thrombocytosis of 484,000, with the remainder of his CBC 

being unremarkable.


His CMP is remarkable for an anion gap mildly elevated at 22.7, but with a 

bicarbonate normal at 21.  His BUN is mildly elevated at 19, but with a Cr 

normal at 0.9, and the remainder of his CMP being unremarkable.


His TSH is within normal limits at 0.969.


His salicylate level is within normal limits at 1.2.


His acetaminophen level is 0.


His EtOH level is significantly elevated at 0.33.





The patient has not yet provided a urine sample for the urine drug screen.





I will order some IV fluid for the patient.  The plan will be to keep the 

patient here in the ED overnight, then I will talk to him in the morning to see 

if he is still feeling suicidal.








03/22/21 06:26


I reevaluated the patient.  He is sober and lucid.  He states that when he told 

his wife that he was going to kill himself, he did not mean it wholeheartedly, 

and he states that he is not currently feeling depressed or suicidal.  He states

 that there is no alcohol at home.  He agreed to follow-up with his PCP, Dr. Mckeon, this week.











Departure





- Departure


Time of Disposition: 06:26


Disposition: Home, Self-Care 01


Condition: Good


Clinical Impression: 


 Alcohol intoxication, Episode of binge consumption of alcohol, Suicidal 

ideation








- Discharge Information


*PRESCRIPTION DRUG MONITORING PROGRAM REVIEWED*: Not Applicable


*COPY OF PRESCRIPTION DRUG MONITORING REPORT IN PATIENT FARHAT: Not Applicable


Instructions:  Suicidal Feelings: How to Help Yourself, Alcohol Intoxication


Referrals: 


Sree Mckeon Jr, MD [Primary Care Provider] - 


Forms:  ED Department Discharge


Additional Instructions: 


You were seen in the emergency room after drinking an excessive amount of 

alcohol since Friday, then threatening to kill yourself last night.





Work-up in the ER included several blood tests and an ECG.





Your alcohol level returned significantly elevated at 0.33.  For reference, that

is more than 4 times above the legal limit for driving.





We recommend that you continue to abstain from drinking alcohol, and follow-up 

with your PCP, Dr. Sree Mckeon, this week.





If any other problems, please do not hesitate to return to the ER.





<Chucky Lincoln - Last Filed: 04/02/21 21:38>





ED HPI GENERAL MEDICAL PROBLEM





- General


Source of Information: Reports: Patient, Family


History Limitations: Reports: No Limitations





- History of Present Illness


INITIAL COMMENTS - FREE TEXT/NARRATIVE: 





55-year-old male presents to the emergency department with complaints of alcohol

intoxication and suicidal thoughts.  Patient is a recovering alcoholic and 

recently got out of inpatient alcohol treatment in August.  The patient's wife 

states he has been sober that time.  Patient's wife states that she left for the

weekend and upon returning she discovered that he had started drinking on 

Friday.  Patient apparently consumed a gallon of hard liquor from Friday night 

until today.  The patient's wife states that she woke this morning and got ready

for [employer].  When she got home she asked the patient if he was going to be going

to his AA meeting when they got into an argument.  The patient eventually left 

for his AA meeting at about 6/630 this evening and the wife left and went to 

visit her daughter here in town.  When she returned home she could tell that the

patient had been drinking and initially he denied it however she discovered an 

empty 1.75 L bottle of hard alcohol outside the garage that was empty.  She 

states that he has not been truthful about his drinking and she is concerned 

that he drank all this in the course of 2 hours.  Patient does admit to drinking

this weekend and states he did drink a gallon of hard liquor earlier however he 

states that the 1.75 L bottle he had only consumed part of it and dumped the 

rest out.  When the wife questioned him he stated he was trying to kill himself 

and no longer had the will to live because he had failed in his sobriety.  

Question him to drink and he was not able to pinpoint any reason for this.  He 

denies any increased stressors or life events contributing to this.





Upon questioning the patient he initially states that he wants to die and did 

intend to attempt to drink himself to death as he feels like he failed in his 

sobriety and life is no longer worth living.  The patient denies having a 

specific plan to kill himself.  And upon further questioning he states that if 

he were allowed to go home he would not attempt to harm himself.  He states he 

is disappointed in himself that he broke his sobriety.  The patient's wife 

states that there are guns in the home however she does not know where they are 

and he admits that he does not not either.  The patient did not confirm that he 

would be willing to go to alcohol treatment again however he was not adamantly 

against it either.





Past medical history is pertinent for chronic alcoholism, hypertension, 

hypercholesteremia, and depression.  Patient denies any history of smoking 

cigarettes or recreational drug use.





Past Medical History


HEENT History: Reports: Impaired Vision


Cardiovascular History: Reports: High Cholesterol, Hypertension


Gastrointestinal History: Reports: GERD


Other Neuro History: Confusion


Psychiatric History: Reports: Addiction, Depression, Suicidal Ideation





- Past Surgical History


GI Surgical History: Reports: Appendectomy





Social & Family History





- Family History


Family Medical History: No Pertinent Family History





- Tobacco Use


Tobacco Use Status *Q: Never Tobacco User


Second Hand Smoke Exposure: No





- Caffeine Use


Caffeine Use: Reports: Coffee





- Recreational Drug Use


Recreational Drug Use: No





ED ROS GENERAL





- Review of Systems


Review Of Systems: See Below


Constitutional: Reports: No Symptoms


HEENT: Reports: No Symptoms


Respiratory: Reports: No Symptoms


Cardiovascular: Reports: No Symptoms


Endocrine: Reports: No Symptoms


GI/Abdominal: Reports: No Symptoms


: Reports: No Symptoms


Musculoskeletal: Reports: No Symptoms


Skin: Reports: No Symptoms


Neurological: Reports: No Symptoms


Psychiatric: Reports: Depression, Suicidal Ideation


Hematologic/Lymphatic: Reports: No Symptoms


Immunologic: Reports: No Symptoms





ED EXAM, BEHAVIORAL HEALTH





- Physical Exam


Exam: See Below


Exam Limited By: No Limitations


General Appearance: Alert, WD/WN, No Apparent Distress


Ears: Normal External Exam, Hearing Grossly Normal


Nose: Normal Inspection


Throat/Mouth: Normal Inspection, Normal Lips, Normal Voice, No Airway Compromise


Head: Atraumatic, Normocephalic


Neck: Normal Inspection, Supple, Non-Tender, Full Range of Motion


Respiratory/Chest: No Respiratory Distress, Lungs Clear, Normal Breath Sounds, 

No Accessory Muscle Use, Chest Non-Tender


Cardiovascular: Normal Peripheral Pulses, Regular Rate, Rhythm, No Edema, No 

Murmur


GI/Abdominal: Normal Bowel Sounds, Soft, Non-Tender, No Distention


 (Male) Exam: Deferred


Rectal (Males) Exam: Deferred


Back Exam: Normal Inspection, Full Range of Motion


Extremities: Normal Inspection, Normal Range of Motion, Non-Tender, No Pedal 

Edema, Normal Capillary Refill


Neurological: Alert, Normal Cognition, Oriented x 3, Other (tearful)


Psychiatric: Alert, Oriented, Flat Affect, Tearful, Suicidal Thoughts


Skin Exam: Warm, Dry, Intact, Normal color, No rash





COURSE, BEHAVIORAL HEALTH COMP





- Course


Vital Signs: 


                                Last Vital Signs











Temp  96.9 F   03/21/21 21:18


 


Pulse  74   03/21/21 21:18


 


Resp  16   03/21/21 21:18


 


BP  135/86   03/21/21 21:18


 


Pulse Ox  94 L  03/21/21 21:18











Orders, Labs, Meds: 


                                Laboratory Tests











  03/21/21 03/21/21 03/21/21 Range/Units





  22:25 22:25 22:25 


 


WBC  12.17 H    (4.23-9.07)  K/mm3


 


RBC  5.74    (4.63-6.08)  M/mm3


 


Hgb  16.0  D    (13.7-17.5)  gm/dl


 


Hct  48.5    (40.1-51.0)  %


 


MCV  84.5    (79.0-92.2)  fl


 


MCH  27.9    (25.7-32.2)  pg


 


MCHC  33.0    (32.2-35.5)  g/dl


 


RDW Std Deviation  44.0 H    (35.1-43.9)  fL


 


Plt Count  484 H D    (163-337)  K/mm3


 


MPV  7.8 L    (9.4-12.3)  fl


 


Neutrophils % (Manual)  46    (40-60)  %


 


Band Neutrophils %  0    (0-10)  %


 


Lymphocytes % (Manual)  46 H    (20-40)  %


 


Atypical Lymphs %  0    %


 


Monocytes % (Manual)  5    (2-10)  %


 


Eosinophils % (Manual)  2    (0.8-7.0)  %


 


Basophils % (Manual)  1    (0.2-1.2)  


 


Platelet Estimate  Increased    


 


RBC Morph Comment  Normal    


 


Sodium   138   (136-145)  mEq/L


 


Potassium   3.7   (3.5-5.1)  mEq/L


 


Chloride   98   ()  mEq/L


 


Carbon Dioxide   21   (21-32)  mEq/L


 


Anion Gap   22.7 H   (5-15)  


 


BUN   19 H   (7-18)  mg/dL


 


Creatinine   0.9   (0.7-1.3)  mg/dL


 


Est Cr Clr Drug Dosing   89.72   mL/min


 


Estimated GFR (MDRD)   > 60   (>60)  mL/min


 


BUN/Creatinine Ratio   21.1 H   (14-18)  


 


Glucose   86   ()  mg/dL


 


Calcium   8.7   (8.5-10.1)  mg/dL


 


Total Bilirubin   0.4   (0.2-1.0)  mg/dL


 


AST   26   (15-37)  U/L


 


ALT   36   (16-63)  U/L


 


Alkaline Phosphatase   99   ()  U/L


 


Total Protein   8.2   (6.4-8.2)  g/dl


 


Albumin   4.0   (3.4-5.0)  g/dl


 


Globulin   4.2   gm/dL


 


Albumin/Globulin Ratio   1.0   (1-2)  


 


TSH 3rd Generation   0.969   (0.358-3.74)  uIU/mL


 


Salicylates    1.2 L  (2.8-20)  mg/dL


 


Acetaminophen   0 L   (10-30)  ug/mL


 


Ethyl Alcohol   0.33   (0.00)  gm%








Medications














Discontinued Medications














Generic Name Dose Route Start Last Admin





  Trade Name Freq  PRN Reason Stop Dose Admin


 


Sodium Chloride  1,000 mls @ 150 mls/hr  03/21/21 23:45  03/21/21 23:45





  Normal Saline  IV   150 mls/hr





  ASDIRECTED [facility]   Administration














Departure





- Departure


Condition: Good





- Discharge Information


*PRESCRIPTION DRUG MONITORING PROGRAM REVIEWED*: Not Applicable


*COPY OF PRESCRIPTION DRUG MONITORING REPORT IN PATIENT FARHAT: Not Applicable





Sepsis Event Note (ED)





- Evaluation


Sepsis Screening Result: No Definite Risk